# Patient Record
Sex: MALE | Race: OTHER | NOT HISPANIC OR LATINO | Employment: PART TIME | URBAN - METROPOLITAN AREA
[De-identification: names, ages, dates, MRNs, and addresses within clinical notes are randomized per-mention and may not be internally consistent; named-entity substitution may affect disease eponyms.]

---

## 2017-04-12 ENCOUNTER — HOSPITAL ENCOUNTER (OUTPATIENT)
Dept: RADIOLOGY | Facility: HOSPITAL | Age: 32
Discharge: HOME | End: 2017-04-12
Attending: INTERNAL MEDICINE
Payer: MEDICAID

## 2017-04-12 DIAGNOSIS — R05.9 COUGH: ICD-10-CM

## 2017-04-12 PROCEDURE — 71020 XR CHEST PA AND LATERAL: CPT | Mod: TC

## 2017-04-12 PROCEDURE — 71020 XR CHEST PA AND LATERAL: CPT | Mod: 26,,, | Performed by: RADIOLOGY

## 2018-06-20 ENCOUNTER — HOSPITAL ENCOUNTER (OUTPATIENT)
Dept: RADIOLOGY | Facility: HOSPITAL | Age: 33
Discharge: HOME | End: 2018-06-20
Payer: MEDICAID

## 2018-06-20 DIAGNOSIS — M54.50 LOWER BACK PAIN: ICD-10-CM

## 2018-06-20 DIAGNOSIS — M54.2 NECK PAIN: ICD-10-CM

## 2018-06-20 DIAGNOSIS — M54.2 NECK PAIN: Primary | ICD-10-CM

## 2018-06-20 PROCEDURE — 72120 X-RAY BEND ONLY L-S SPINE: CPT | Mod: TC,FY

## 2018-06-20 PROCEDURE — 72100 X-RAY EXAM L-S SPINE 2/3 VWS: CPT | Mod: 26,,, | Performed by: RADIOLOGY

## 2018-06-20 PROCEDURE — 72120 X-RAY BEND ONLY L-S SPINE: CPT | Mod: 26,,, | Performed by: RADIOLOGY

## 2018-06-20 PROCEDURE — 72050 X-RAY EXAM NECK SPINE 4/5VWS: CPT | Mod: 26,,, | Performed by: RADIOLOGY

## 2018-06-20 PROCEDURE — 72050 X-RAY EXAM NECK SPINE 4/5VWS: CPT | Mod: TC,FY

## 2019-05-17 ENCOUNTER — OCCUPATIONAL HEALTH (OUTPATIENT)
Dept: URGENT CARE | Facility: CLINIC | Age: 34
End: 2019-05-17

## 2019-05-17 DIAGNOSIS — Z02.1 PRE-EMPLOYMENT EXAMINATION: Primary | ICD-10-CM

## 2019-05-17 LAB
CTP QC/QA: YES
FECAL OCCULT BLOOD, POC: NEGATIVE

## 2019-05-17 PROCEDURE — 89240 OOH PANEL 3 (CMP, CBCW/DIFF, MUA, LIPID): ICD-10-PCS | Mod: S$GLB,,, | Performed by: NURSE PRACTITIONER

## 2019-05-17 PROCEDURE — 86703 HIV-1/HIV-2 1 RESULT ANTBDY: CPT | Mod: S$GLB,,, | Performed by: NURSE PRACTITIONER

## 2019-05-17 PROCEDURE — 86703 CHG HIV-1/HIV-2, SINGLE ASSAY: ICD-10-PCS | Mod: S$GLB,,, | Performed by: NURSE PRACTITIONER

## 2019-05-17 PROCEDURE — 86592 PR  SYPHILIS TEST NON TREPONEMAL ANTIBODY QUAL: ICD-10-PCS | Mod: S$GLB,,, | Performed by: NURSE PRACTITIONER

## 2019-05-17 PROCEDURE — 86580 POCT TB SKIN TEST: ICD-10-PCS | Mod: S$GLB,,, | Performed by: NURSE PRACTITIONER

## 2019-05-17 PROCEDURE — 89240 UNLISTED MISC PATH TEST: CPT | Mod: S$GLB,,, | Performed by: NURSE PRACTITIONER

## 2019-05-17 PROCEDURE — 82270 POCT OCCULT BLOOD STOOL: ICD-10-PCS | Mod: S$GLB,,, | Performed by: NURSE PRACTITIONER

## 2019-05-17 PROCEDURE — 86580 TB INTRADERMAL TEST: CPT | Mod: S$GLB,,, | Performed by: NURSE PRACTITIONER

## 2019-05-17 PROCEDURE — 82270 OCCULT BLOOD FECES: CPT | Mod: S$GLB,,, | Performed by: NURSE PRACTITIONER

## 2019-05-17 PROCEDURE — 99499 UNLISTED E&M SERVICE: CPT | Mod: S$GLB,,, | Performed by: NURSE PRACTITIONER

## 2019-05-17 PROCEDURE — 86592 SYPHILIS TEST NON-TREP QUAL: CPT | Mod: S$GLB,,, | Performed by: NURSE PRACTITIONER

## 2019-05-17 PROCEDURE — 92552 PURE TONE AUDIOMETRY AIR: CPT | Mod: S$GLB,,, | Performed by: NURSE PRACTITIONER

## 2019-05-17 PROCEDURE — 80305 OOH NON-DOT DRUG SCREEN: ICD-10-PCS | Mod: S$GLB,,, | Performed by: NURSE PRACTITIONER

## 2019-05-17 PROCEDURE — 99499 PHYSICAL, BASIC COMPLEXITY: ICD-10-PCS | Mod: S$GLB,,, | Performed by: NURSE PRACTITIONER

## 2019-05-17 PROCEDURE — 92552 AUDIOGRAM OCC MED: ICD-10-PCS | Mod: S$GLB,,, | Performed by: NURSE PRACTITIONER

## 2019-05-17 PROCEDURE — 80305 DRUG TEST PRSMV DIR OPT OBS: CPT | Mod: S$GLB,,, | Performed by: NURSE PRACTITIONER

## 2019-05-29 LAB
TB INDURATION - 48 HR READ: ABNORMAL MM
TB INDURATION - 72 HR READ: 10 MM
TB SKIN TEST - 48 HR READ: ABNORMAL
TB SKIN TEST - 72 HR READ: ABNORMAL

## 2019-05-31 ENCOUNTER — OCCUPATIONAL HEALTH (OUTPATIENT)
Dept: URGENT CARE | Facility: CLINIC | Age: 34
End: 2019-05-31

## 2019-05-31 DIAGNOSIS — Z02.1 PRE-EMPLOYMENT EXAMINATION: Primary | ICD-10-CM

## 2019-05-31 PROCEDURE — 71045 X-RAY EXAM CHEST 1 VIEW: CPT | Mod: FY,S$GLB,, | Performed by: RADIOLOGY

## 2019-05-31 PROCEDURE — 71045 XR CHEST 1 VIEW: ICD-10-PCS | Mod: FY,S$GLB,, | Performed by: RADIOLOGY

## 2020-11-25 ENCOUNTER — IMMUNIZATION (OUTPATIENT)
Dept: PHARMACY | Facility: CLINIC | Age: 35
End: 2020-11-25

## 2020-11-25 ENCOUNTER — IMMUNIZATION (OUTPATIENT)
Dept: PHARMACY | Facility: CLINIC | Age: 35
End: 2020-11-25
Payer: COMMERCIAL

## 2020-12-02 ENCOUNTER — CLINICAL SUPPORT (OUTPATIENT)
Dept: URGENT CARE | Facility: CLINIC | Age: 35
End: 2020-12-02
Payer: COMMERCIAL

## 2020-12-02 DIAGNOSIS — U07.1 COVID-19 VIRUS DETECTED: ICD-10-CM

## 2020-12-02 DIAGNOSIS — Z11.9 ENCOUNTER FOR SCREENING EXAMINATION FOR INFECTIOUS DISEASE: Primary | ICD-10-CM

## 2020-12-02 LAB
CTP QC/QA: YES
SARS-COV-2 RDRP RESP QL NAA+PROBE: POSITIVE

## 2020-12-02 PROCEDURE — U0002: ICD-10-PCS | Mod: QW,S$GLB,, | Performed by: INTERNAL MEDICINE

## 2020-12-02 PROCEDURE — U0002 COVID-19 LAB TEST NON-CDC: HCPCS | Mod: QW,S$GLB,, | Performed by: INTERNAL MEDICINE

## 2020-12-02 NOTE — LETTER
[address] [facility], [address] [place] [address]  Phone: [phone]  Fax: [phone]          Return to Work/School    Patient: Javier Gama  YOB: 1985   Date: 12/02/2020     To Whom It May Concern:     Javier Gama was in contact with/seen in my office on 12/02/2020. COVID-19 is present in our communities across the state. There is limited testing for COVID at this time, so not all patients can be tested. In this situation, your employee meets the following criteria:     Javier Gama has met the criteria for COVID-19 testing and has a POSITIVE result. He can return to work once they are asymptomatic for 24 hours without the use of fever reducing medications AND at least ten days from the start of symptoms (or from the first positive result if they have no symptoms).      If you have any questions or concerns, or if I can be of further assistance, please do not hesitate to contact me.     Sincerely,    NURSE URGENT CARE, [facility] [place]

## 2020-12-02 NOTE — PATIENT INSTRUCTIONS
Your test was POSITIVE for COVID-19 (coronavirus).       Please isolate yourself at home.  You may leave home and/or return to work once the following conditions are met:    If you were not hospitalized and are not severely immunocompromised*:   More than 10 days since symptoms first appeared AND   More than 24 hours fever free without medications AND   Symptoms have improved     If you were hospitalized OR are severely immunocompromised*:   More than 20 days since symptoms first appeared   More than 24 hours fever free without medications   Symptoms have improved    If you had no symptoms but tested positive:   More than 10 days since the date of the first positive test (20 days if severely immunocompromised).   If you develop symptoms, then use the guidelines above.     *Definition of severely immunocompromised:  - Current chemotherapy for cancer  - Untreated HIV with CD4 count less than 200  - Combined primary immunodeficiency disorder  - Prednisone more than 20 mg per day for more than 14 days  - Post-transplant patients    Additional instructions:   Separate yourself from other people and animals in your home.   Call ahead before visiting your doctor.   Wear a facemask when around others.   Cover your coughs and sneezes.   Wash your hands often with soap and water; hand  can be used, too.   Avoid sharing personal household items.   Wipe down surfaces used daily.   Monitor your symptoms. Seek prompt medical attention if your illness is worsening (e.g., difficulty breathing).    Before seeking care, call your healthcare provider.   If you have a medical emergency and need to call 911, notify the dispatch personnel that you have, or are being evaluated for COVID-19. If possible, put on a facemask before emergency medical services arrive.        Contact Tracing    As one of the next steps, you will receive a call or text from the [place] Department of [facility] ([facility]) COVID-19 Tracing Team.  See the contact information below so you know not to ignore the health departments call. It is important that you contact them back immediately so they can help.      Contact Tracer Number:  [phone]  Caller ID for most carriers: [place] Dept Health     What is contact tracing?  · Contact tracing is a process that helps identify everyone who has been in close contact with an infected person. Contact tracers let those people know they may have been exposed and guide them on next steps. Confidentiality is important for everyone; no one will be told who may have exposed them to the virus.  · Your involvement is important. The more we know about where and how this virus is spreading, the better chance we have at stopping it from spreading further.  What does exposure mean?  · Exposure means you have been within 6 feet for more than 15 minutes with a person who has or had COVID-19.  What kind of questions do the contact tracers ask?  · A contact tracer will confirm your basic contact information including name, address, phone number, and next of kin, as well as asking about any symptoms you may have had. Theyll also ask you how you think you may have gotten sick, such as places where you may have been exposed to the virus, and people you were with. Those names will never be shared with anyone outside of that call, and will only be used to help trace and stop the spread of the virus.   I have privacy concerns. How will the state use my information?  · Your privacy about your health is important. All calls are completed using call centers that use the appropriate health privacy protection measures (HIPAA compliance), meaning that your patient information is safe. No one will ever ask you any questions related to immigration status. Your health comes first.   Do I have to participate?  · You do not have to participate, but we strongly encourage you to. Contact tracing can help us catch and control new outbreaks as  theyre developing to keep your friends and family safe.   What if I dont hear from anyone?  · If you dont receive a call within 24 hours, you can call the number above right away to inquire about your status. That line is open from 8:00 am - 8:00 p.m., 7 days a week.  Contact tracing saves lives! Together, we have the power to beat this virus and keep our loved ones and neighbors safe.    For more information see CDC link below.      https://www.cdc.gov/coronavirus/2019-ncov/hcp/guidance-prevent-spread.html#precautions        Sources:  [facility], [place] Department [facility] and hospitals           Sincerely,     Simon Cisneros RT

## 2020-12-03 ENCOUNTER — NURSE TRIAGE (OUTPATIENT)
Dept: ADMINISTRATIVE | Facility: CLINIC | Age: 35
End: 2020-12-03

## 2020-12-03 NOTE — TELEPHONE ENCOUNTER
No answer, no vm to leave message. 2 attempts.    Reason for Disposition   Unable to complete triage due to phone connection issues    Protocols used: NO CONTACT OR DUPLICATE CONTACT CALL-A-AH

## 2022-06-30 ENCOUNTER — OFFICE VISIT (OUTPATIENT)
Dept: INTERNAL MEDICINE | Facility: CLINIC | Age: 37
End: 2022-06-30
Payer: COMMERCIAL

## 2022-06-30 VITALS
SYSTOLIC BLOOD PRESSURE: 124 MMHG | HEART RATE: 64 BPM | HEIGHT: 68 IN | DIASTOLIC BLOOD PRESSURE: 82 MMHG | WEIGHT: 160.5 LBS | BODY MASS INDEX: 24.32 KG/M2 | TEMPERATURE: 98 F

## 2022-06-30 DIAGNOSIS — Z13.220 LIPID SCREENING: ICD-10-CM

## 2022-06-30 DIAGNOSIS — Z11.59 ENCOUNTER FOR HEPATITIS C SCREENING TEST FOR LOW RISK PATIENT: ICD-10-CM

## 2022-06-30 DIAGNOSIS — Z11.4 ENCOUNTER FOR SCREENING FOR HIV: ICD-10-CM

## 2022-06-30 DIAGNOSIS — Z00.00 ROUTINE GENERAL MEDICAL EXAMINATION AT A HEALTH CARE FACILITY: Primary | ICD-10-CM

## 2022-06-30 DIAGNOSIS — Z83.3 FAMILY HISTORY OF DIABETES MELLITUS IN FATHER: ICD-10-CM

## 2022-06-30 DIAGNOSIS — Z13.21 ENCOUNTER FOR VITAMIN DEFICIENCY SCREENING: ICD-10-CM

## 2022-06-30 PROBLEM — Z86.16 HISTORY OF COVID-19: Status: ACTIVE | Noted: 2022-06-30

## 2022-06-30 PROCEDURE — 1159F PR MEDICATION LIST DOCUMENTED IN MEDICAL RECORD: ICD-10-PCS | Mod: CPTII,S$GLB,, | Performed by: INTERNAL MEDICINE

## 2022-06-30 PROCEDURE — 99385 PREV VISIT NEW AGE 18-39: CPT | Mod: S$GLB,,, | Performed by: INTERNAL MEDICINE

## 2022-06-30 PROCEDURE — 3079F DIAST BP 80-89 MM HG: CPT | Mod: CPTII,S$GLB,, | Performed by: INTERNAL MEDICINE

## 2022-06-30 PROCEDURE — 1160F PR REVIEW ALL MEDS BY PRESCRIBER/CLIN PHARMACIST DOCUMENTED: ICD-10-PCS | Mod: CPTII,S$GLB,, | Performed by: INTERNAL MEDICINE

## 2022-06-30 PROCEDURE — 1159F MED LIST DOCD IN RCRD: CPT | Mod: CPTII,S$GLB,, | Performed by: INTERNAL MEDICINE

## 2022-06-30 PROCEDURE — 3074F SYST BP LT 130 MM HG: CPT | Mod: CPTII,S$GLB,, | Performed by: INTERNAL MEDICINE

## 2022-06-30 PROCEDURE — 1160F RVW MEDS BY RX/DR IN RCRD: CPT | Mod: CPTII,S$GLB,, | Performed by: INTERNAL MEDICINE

## 2022-06-30 PROCEDURE — 3074F PR MOST RECENT SYSTOLIC BLOOD PRESSURE < 130 MM HG: ICD-10-PCS | Mod: CPTII,S$GLB,, | Performed by: INTERNAL MEDICINE

## 2022-06-30 PROCEDURE — 3008F BODY MASS INDEX DOCD: CPT | Mod: CPTII,S$GLB,, | Performed by: INTERNAL MEDICINE

## 2022-06-30 PROCEDURE — 99385 PR PREVENTIVE VISIT,NEW,18-39: ICD-10-PCS | Mod: S$GLB,,, | Performed by: INTERNAL MEDICINE

## 2022-06-30 PROCEDURE — 3008F PR BODY MASS INDEX (BMI) DOCUMENTED: ICD-10-PCS | Mod: CPTII,S$GLB,, | Performed by: INTERNAL MEDICINE

## 2022-06-30 PROCEDURE — 3079F PR MOST RECENT DIASTOLIC BLOOD PRESSURE 80-89 MM HG: ICD-10-PCS | Mod: CPTII,S$GLB,, | Performed by: INTERNAL MEDICINE

## 2022-06-30 RX ORDER — ASCORBIC ACID 500 MG
500 TABLET ORAL DAILY
COMMUNITY

## 2022-06-30 RX ORDER — MULTIVIT WITH MINERALS/HERBS
1 TABLET ORAL DAILY
COMMUNITY
End: 2023-02-07

## 2022-06-30 RX ORDER — CHOLECALCIFEROL (VITAMIN D3) 125 MCG
5000 CAPSULE ORAL 3 TIMES DAILY
COMMUNITY
End: 2022-08-01

## 2022-06-30 NOTE — PROGRESS NOTES
Chief C/o:    Establish Care and Foot Pain (Pt. c/o pain in (B) feet off and on for more than 1 yr. Pt.states his feet feel better with elevation.)        Health Care Maintenance    Health Maintenance       Date Due Completion Date    Hepatitis C Screening Never done ---    Lipid Panel Never done ---    COVID-19 Vaccine (1) Never done ---    HIV Screening Never done ---    Influenza Vaccine (Season Ended) 09/01/2022 11/24/2020    TETANUS VACCINE 11/24/2030 11/24/2020                 HISTORY OF PRESENT ILLNESS:    STEFAN Gama is a 37 y.o. male who presents to the clinic today for Establish Care and Foot Pain (Pt. c/o pain in (B) feet off and on for more than 1 yr. Pt.states his feet feel better with elevation.)  . Patient is having no chest pain, no shortness of breath, no fever no chills.  Patient is having no side effects related to current medications.                  ALLERGIES AND MEDICATIONS: updated and reviewed.  Review of patient's allergies indicates:  No Known Allergies      Problem List:  Patient Active Problem List   Diagnosis    Varicose vein of leg    BMI 24.0-24.9, adult    History of COVID-19    Family history of diabetes mellitus in father         CARE TEAM:    Patient Care Team:  Judi Pearl MD as PCP - General (Internal Medicine)  Juany Quintanilla MD as Consulting Physician (Internal Medicine)         REVIEW OF SYSTEMS:    Review of Systems   Constitutional: Negative for appetite change, chills, diaphoresis, fatigue, fever and unexpected weight change.   HENT: Negative for congestion, drooling, ear discharge, ear pain, facial swelling, hearing loss, nosebleeds, rhinorrhea, sinus pain, sneezing, sore throat, tinnitus, trouble swallowing and voice change.    Eyes: Negative for pain, discharge, redness, itching and visual disturbance.   Respiratory: Negative for cough, choking, chest tightness, shortness of breath, wheezing and stridor.    Cardiovascular: Negative for chest  "pain, palpitations and leg swelling.   Gastrointestinal: Negative for abdominal distention, abdominal pain, blood in stool, constipation, diarrhea, nausea and vomiting.   Endocrine: Negative for cold intolerance, heat intolerance, polydipsia, polyphagia and polyuria.   Genitourinary: Negative for difficulty urinating, dysuria, flank pain, frequency, hematuria and urgency.   Musculoskeletal: Negative for arthralgias, back pain, gait problem, joint swelling, myalgias, neck pain and neck stiffness.   Skin: Negative for color change, pallor, rash and wound.   Allergic/Immunologic: Negative for environmental allergies, food allergies and immunocompromised state.   Neurological: Negative for dizziness, tremors, seizures, syncope, speech difficulty, weakness, light-headedness, numbness and headaches.   Hematological: Negative for adenopathy. Does not bruise/bleed easily.   Psychiatric/Behavioral: Negative for agitation, behavioral problems, confusion, decreased concentration, dysphoric mood, hallucinations, sleep disturbance and suicidal ideas. The patient is not nervous/anxious.          PHYSICAL EXAM:    Vitals:    06/30/22 0844   BP: 124/82   Pulse: 64   Temp: 97.5 °F (36.4 °C)     Weight: 72.8 kg (160 lb 7.9 oz)   Height: 5' 7.72" (172 cm)   Body mass index is 24.61 kg/m².  Vitals:    06/30/22 0844   BP: 124/82   Pulse: 64   Temp: 97.5 °F (36.4 °C)   TempSrc: Temporal   Weight: 72.8 kg (160 lb 7.9 oz)   Height: 5' 7.72" (1.72 m)   PainSc: 0-No pain          Physical Exam  Vitals and nursing note reviewed.   Constitutional:       General: He is not in acute distress.     Appearance: He is not ill-appearing, toxic-appearing or diaphoretic.   HENT:      Head: Normocephalic and atraumatic.      Right Ear: Tympanic membrane, ear canal and external ear normal. There is no impacted cerumen.      Left Ear: Tympanic membrane, ear canal and external ear normal. There is no impacted cerumen.      Nose: Nose normal. No congestion " or rhinorrhea.      Mouth/Throat:      Mouth: Mucous membranes are moist.      Pharynx: Oropharynx is clear. No oropharyngeal exudate or posterior oropharyngeal erythema.   Eyes:      General: No scleral icterus.        Right eye: No discharge.         Left eye: No discharge.      Extraocular Movements: Extraocular movements intact.      Conjunctiva/sclera: Conjunctivae normal.      Pupils: Pupils are equal, round, and reactive to light.   Cardiovascular:      Rate and Rhythm: Normal rate and regular rhythm.      Pulses: Normal pulses.      Heart sounds: Normal heart sounds. No murmur heard.    No friction rub. No gallop.   Pulmonary:      Effort: Pulmonary effort is normal. No respiratory distress.      Breath sounds: Normal breath sounds. No stridor. No wheezing, rhonchi or rales.   Chest:      Chest wall: No tenderness.   Abdominal:      General: Bowel sounds are normal. There is no distension.      Palpations: Abdomen is soft. There is no mass.      Tenderness: There is no abdominal tenderness. There is no guarding or rebound.      Hernia: No hernia is present.   Musculoskeletal:         General: No swelling, tenderness, deformity or signs of injury. Normal range of motion.      Cervical back: Normal range of motion and neck supple. No rigidity.      Right lower leg: No edema.      Left lower leg: No edema.   Lymphadenopathy:      Cervical: No cervical adenopathy.   Skin:     General: Skin is warm and dry.      Capillary Refill: Capillary refill takes less than 2 seconds.      Coloration: Skin is not jaundiced or pale.      Findings: No bruising, erythema, lesion or rash.   Neurological:      General: No focal deficit present.      Mental Status: He is alert and oriented to person, place, and time.      Cranial Nerves: No cranial nerve deficit.      Sensory: No sensory deficit.      Motor: No weakness.      Coordination: Coordination normal.      Deep Tendon Reflexes: Reflexes normal.   Psychiatric:         Mood  and Affect: Mood normal.         Behavior: Behavior normal.         Thought Content: Thought content normal.         Judgment: Judgment normal.            Labs:    No results found for: GLU, NA, K, CL, CO2, BUN, CREATININE, CALCIUM, PROT, ALBUMIN, BILITOT, ALKPHOS, AST, ALT, ANIONGAP, ESTGFRAFRICA, EGFRNONAA  No results found for: WBC, RBC, HGB, HCT, MCV, RDW, PLT   No results found for: CHOL, TRIG, HDL, LDLCALC, TOTALCHOLEST  No results found for: TSH  No results found for: HGBA1C, ESTIMATEDAVG   No components found for: MICROALBUMIN/CREATININE    ASSESSMENT & PLAN:    1. Routine general medical examination at a health care facility  - Comprehensive Metabolic Panel; Future  - Lipid Panel; Future  - TSH; Future  - CBC Auto Differential; Future  - Hepatitis C Antibody; Future  - HIV 1/2 Ag/Ab (4th Gen); Future  - Comprehensive Metabolic Panel  - Lipid Panel  - TSH  - CBC Auto Differential  - Hepatitis C Antibody  - HIV 1/2 Ag/Ab (4th Gen)    2. BMI 24.0-24.9, adult    3. Lipid screening  - Lipid Panel; Future  - Lipid Panel    4. Encounter for screening for HIV  - HIV 1/2 Ag/Ab (4th Gen); Future  - HIV 1/2 Ag/Ab (4th Gen)    5. Encounter for hepatitis C screening test for low risk patient  - Hepatitis C Antibody; Future  - Hepatitis C Antibody    6. Encounter for vitamin deficiency screening    7. Family history of diabetes mellitus in father       Having to be established with no significant new complains, his weight is within normal limits and advised to keep BMI below 25 with healthy diet and exercise, will start with a compressive lab test and follow-up after that..    Orders Placed This Encounter   Procedures    Comprehensive Metabolic Panel    Lipid Panel    TSH    CBC Auto Differential    Hepatitis C Antibody    HIV 1/2 Ag/Ab (4th Gen)      No follow-ups on file. or sooner as needed.    Patient was counseled and questions and concerns were addressed.    Please note:  Parts of this report were done using  a dictation software, voice to text, and sometimes the text contains some uncorrected words or sentences that are missed during revision.

## 2022-07-26 LAB
25(OH)D3+25(OH)D2 SERPL-MCNC: 131.6 NG/ML (ref 30–100)
ALBUMIN SERPL-MCNC: 4.8 G/DL (ref 4–5)
ALBUMIN/GLOB SERPL: 2.4 {RATIO} (ref 1.2–2.2)
ALP SERPL-CCNC: 62 IU/L (ref 44–121)
ALT SERPL-CCNC: 26 IU/L (ref 0–44)
AST SERPL-CCNC: 23 IU/L (ref 0–40)
BASOPHILS # BLD AUTO: 0 X10E3/UL (ref 0–0.2)
BASOPHILS NFR BLD AUTO: 1 %
BILIRUB SERPL-MCNC: 0.2 MG/DL (ref 0–1.2)
BUN SERPL-MCNC: 14 MG/DL (ref 6–20)
BUN/CREAT SERPL: 16 (ref 9–20)
CALCIUM SERPL-MCNC: 9.6 MG/DL (ref 8.7–10.2)
CHLORIDE SERPL-SCNC: 101 MMOL/L (ref 96–106)
CHOLEST SERPL-MCNC: 188 MG/DL (ref 100–199)
CO2 SERPL-SCNC: 26 MMOL/L (ref 20–29)
CREAT SERPL-MCNC: 0.86 MG/DL (ref 0.76–1.27)
EOSINOPHIL # BLD AUTO: 0.4 X10E3/UL (ref 0–0.4)
EOSINOPHIL NFR BLD AUTO: 6 %
ERYTHROCYTE [DISTWIDTH] IN BLOOD BY AUTOMATED COUNT: 14 % (ref 11.6–15.4)
EST. GFR  (NO RACE VARIABLE): 113 ML/MIN/1.73
GLOBULIN SER CALC-MCNC: 2 G/DL (ref 1.5–4.5)
GLUCOSE SERPL-MCNC: 85 MG/DL (ref 65–99)
HCT VFR BLD AUTO: 48.4 % (ref 37.5–51)
HCV AB S/CO SERPL IA: <0.1 S/CO RATIO (ref 0–0.9)
HDLC SERPL-MCNC: 48 MG/DL
HGB BLD-MCNC: 14.5 G/DL (ref 13–17.7)
HIV 1+2 AB+HIV1 P24 AG SERPL QL IA: NON REACTIVE
IMM GRANULOCYTES # BLD AUTO: 0 X10E3/UL (ref 0–0.1)
IMM GRANULOCYTES NFR BLD AUTO: 1 %
IMP & REVIEW OF LAB RESULTS: NORMAL
LDLC SERPL CALC-MCNC: 115 MG/DL (ref 0–99)
LYMPHOCYTES # BLD AUTO: 2.7 X10E3/UL (ref 0.7–3.1)
LYMPHOCYTES NFR BLD AUTO: 41 %
MCH RBC QN AUTO: 25 PG (ref 26.6–33)
MCHC RBC AUTO-ENTMCNC: 30 G/DL (ref 31.5–35.7)
MCV RBC AUTO: 84 FL (ref 79–97)
MONOCYTES # BLD AUTO: 0.5 X10E3/UL (ref 0.1–0.9)
MONOCYTES NFR BLD AUTO: 7 %
NEUTROPHILS # BLD AUTO: 2.9 X10E3/UL (ref 1.4–7)
NEUTROPHILS NFR BLD AUTO: 44 %
PLATELET # BLD AUTO: 263 X10E3/UL (ref 150–450)
POTASSIUM SERPL-SCNC: 4.5 MMOL/L (ref 3.5–5.2)
PROT SERPL-MCNC: 6.8 G/DL (ref 6–8.5)
RBC # BLD AUTO: 5.79 X10E6/UL (ref 4.14–5.8)
SODIUM SERPL-SCNC: 138 MMOL/L (ref 134–144)
TRIGL SERPL-MCNC: 141 MG/DL (ref 0–149)
TSH SERPL DL<=0.005 MIU/L-ACNC: 1.38 UIU/ML (ref 0.45–4.5)
VLDLC SERPL CALC-MCNC: 25 MG/DL (ref 5–40)
WBC # BLD AUTO: 6.6 X10E3/UL (ref 3.4–10.8)

## 2022-08-01 ENCOUNTER — OFFICE VISIT (OUTPATIENT)
Dept: INTERNAL MEDICINE | Facility: CLINIC | Age: 37
End: 2022-08-01
Payer: COMMERCIAL

## 2022-08-01 VITALS
DIASTOLIC BLOOD PRESSURE: 82 MMHG | BODY MASS INDEX: 24.94 KG/M2 | HEART RATE: 69 BPM | TEMPERATURE: 98 F | SYSTOLIC BLOOD PRESSURE: 130 MMHG | WEIGHT: 164.56 LBS | HEIGHT: 68 IN

## 2022-08-01 DIAGNOSIS — M79.672 CHRONIC PAIN OF BOTH FEET: ICD-10-CM

## 2022-08-01 DIAGNOSIS — E78.00 PURE HYPERCHOLESTEROLEMIA: ICD-10-CM

## 2022-08-01 DIAGNOSIS — Z00.00 ROUTINE GENERAL MEDICAL EXAMINATION AT A HEALTH CARE FACILITY: Primary | ICD-10-CM

## 2022-08-01 DIAGNOSIS — M79.671 CHRONIC PAIN OF BOTH FEET: ICD-10-CM

## 2022-08-01 DIAGNOSIS — E67.3 HYPERVITAMINOSIS D: ICD-10-CM

## 2022-08-01 DIAGNOSIS — G89.29 CHRONIC PAIN OF BOTH FEET: ICD-10-CM

## 2022-08-01 PROCEDURE — 99395 PREV VISIT EST AGE 18-39: CPT | Mod: S$GLB,,, | Performed by: INTERNAL MEDICINE

## 2022-08-01 PROCEDURE — 1160F RVW MEDS BY RX/DR IN RCRD: CPT | Mod: CPTII,S$GLB,, | Performed by: INTERNAL MEDICINE

## 2022-08-01 PROCEDURE — 99395 PR PREVENTIVE VISIT,EST,18-39: ICD-10-PCS | Mod: S$GLB,,, | Performed by: INTERNAL MEDICINE

## 2022-08-01 PROCEDURE — 1159F MED LIST DOCD IN RCRD: CPT | Mod: CPTII,S$GLB,, | Performed by: INTERNAL MEDICINE

## 2022-08-01 PROCEDURE — 3008F BODY MASS INDEX DOCD: CPT | Mod: CPTII,S$GLB,, | Performed by: INTERNAL MEDICINE

## 2022-08-01 PROCEDURE — 3075F PR MOST RECENT SYSTOLIC BLOOD PRESS GE 130-139MM HG: ICD-10-PCS | Mod: CPTII,S$GLB,, | Performed by: INTERNAL MEDICINE

## 2022-08-01 PROCEDURE — 3008F PR BODY MASS INDEX (BMI) DOCUMENTED: ICD-10-PCS | Mod: CPTII,S$GLB,, | Performed by: INTERNAL MEDICINE

## 2022-08-01 PROCEDURE — 1160F PR REVIEW ALL MEDS BY PRESCRIBER/CLIN PHARMACIST DOCUMENTED: ICD-10-PCS | Mod: CPTII,S$GLB,, | Performed by: INTERNAL MEDICINE

## 2022-08-01 PROCEDURE — 3075F SYST BP GE 130 - 139MM HG: CPT | Mod: CPTII,S$GLB,, | Performed by: INTERNAL MEDICINE

## 2022-08-01 PROCEDURE — 1159F PR MEDICATION LIST DOCUMENTED IN MEDICAL RECORD: ICD-10-PCS | Mod: CPTII,S$GLB,, | Performed by: INTERNAL MEDICINE

## 2022-08-01 PROCEDURE — 3079F DIAST BP 80-89 MM HG: CPT | Mod: CPTII,S$GLB,, | Performed by: INTERNAL MEDICINE

## 2022-08-01 PROCEDURE — 3079F PR MOST RECENT DIASTOLIC BLOOD PRESSURE 80-89 MM HG: ICD-10-PCS | Mod: CPTII,S$GLB,, | Performed by: INTERNAL MEDICINE

## 2022-08-01 NOTE — PROGRESS NOTES
Chief C/o:    Follow-up (Lab results) and Leg Pain (Bilateral legs radiates down to both feet)        Health Care Maintenance    Health Maintenance       Date Due Completion Date    Influenza Vaccine (1) 09/01/2022 11/24/2020    Lipid Panel 07/25/2027 7/25/2022    TETANUS VACCINE 11/24/2030 11/24/2020                 HISTORY OF PRESENT ILLNESS:    STEFAN Gama is a 37 y.o. male who presents to the clinic today for Follow-up (Lab results) and Leg Pain (Bilateral legs radiates down to both feet)  .  Patient is having pain in his feet, both sides, for several years, the pain is on and off, it increased with standing and walking, improves with elevation of the legs, patient sleeps putting about 3 pillows under his feet.   Patient is having no chest pain, no shortness of breath, no fever no chills.  Patient is having no side effects related to current medications.                   ALLERGIES AND MEDICATIONS: updated and reviewed.  Review of patient's allergies indicates:  No Known Allergies  Medication List with Changes/Refills   Current Medications    ASCORBIC ACID, VITAMIN C, (VITAMIN C) 500 MG TABLET    Take 500 mg by mouth once daily.    B COMPLEX VITAMINS TABLET    Take 1 tablet by mouth once daily.   Discontinued Medications    CHOLECALCIFEROL, VITAMIN D3, 125 MCG (5,000 UNIT) CAPSULE    Take 5,000 Units by mouth 3 (three) times daily.       Problem List:  Patient Active Problem List   Diagnosis    Varicose vein of leg    History of COVID-19    Family history of diabetes mellitus in father    Pure hypercholesterolemia    Hypervitaminosis D    BMI 25.0-25.9,adult    Chronic pain of both feet         CARE TEAM:    Patient Care Team:  Judi Pearl MD as PCP - General (Internal Medicine)  Juany Quintanilla MD as Consulting Physician (Internal Medicine)         REVIEW OF SYSTEMS:    Review of Systems   Constitutional: Negative for appetite change, chills, diaphoresis, fatigue, fever and unexpected  "weight change.   HENT: Negative for congestion, drooling, ear discharge, ear pain, facial swelling, hearing loss, nosebleeds, rhinorrhea, sinus pain, sneezing, sore throat, tinnitus, trouble swallowing and voice change.    Eyes: Negative for pain, discharge, redness, itching and visual disturbance.   Respiratory: Negative for cough, choking, chest tightness, shortness of breath, wheezing and stridor.    Cardiovascular: Negative for chest pain, palpitations and leg swelling.   Gastrointestinal: Negative for abdominal distention, abdominal pain, blood in stool, constipation, diarrhea, nausea and vomiting.   Endocrine: Negative for cold intolerance, heat intolerance, polydipsia, polyphagia and polyuria.   Genitourinary: Negative for difficulty urinating, dysuria, flank pain, frequency, hematuria and urgency.   Musculoskeletal: Negative for arthralgias, back pain, gait problem, joint swelling, myalgias, neck pain and neck stiffness.        Bilateral feet pain   Skin: Negative for color change, pallor, rash and wound.   Allergic/Immunologic: Negative for environmental allergies, food allergies and immunocompromised state.   Neurological: Negative for dizziness, tremors, seizures, syncope, speech difficulty, weakness, light-headedness, numbness and headaches.   Hematological: Negative for adenopathy. Does not bruise/bleed easily.   Psychiatric/Behavioral: Negative for agitation, behavioral problems, confusion, decreased concentration, dysphoric mood, hallucinations, sleep disturbance and suicidal ideas. The patient is not nervous/anxious.          PHYSICAL EXAM:    Vitals:    08/01/22 1414   BP: 130/82   Pulse: 69   Temp: 97.9 °F (36.6 °C)     Weight: 74.6 kg (164 lb 9.2 oz)   Height: 5' 7.72" (172 cm)   Body mass index is 25.23 kg/m².  Vitals:    08/01/22 1414   BP: 130/82   Pulse: 69   Temp: 97.9 °F (36.6 °C)   TempSrc: Temporal   Weight: 74.6 kg (164 lb 9.2 oz)   Height: 5' 7.72" (1.72 m)   PainSc:   8    "       Physical Exam  Vitals and nursing note reviewed.   Constitutional:       General: He is not in acute distress.     Appearance: He is not ill-appearing, toxic-appearing or diaphoretic.   HENT:      Head: Normocephalic and atraumatic.      Right Ear: Tympanic membrane, ear canal and external ear normal. There is no impacted cerumen.      Left Ear: Tympanic membrane, ear canal and external ear normal. There is no impacted cerumen.      Nose: Nose normal. No congestion or rhinorrhea.      Mouth/Throat:      Mouth: Mucous membranes are moist.      Pharynx: Oropharynx is clear. No oropharyngeal exudate or posterior oropharyngeal erythema.   Eyes:      General: No scleral icterus.        Right eye: No discharge.         Left eye: No discharge.      Extraocular Movements: Extraocular movements intact.      Conjunctiva/sclera: Conjunctivae normal.      Pupils: Pupils are equal, round, and reactive to light.   Cardiovascular:      Rate and Rhythm: Normal rate and regular rhythm.      Pulses: Normal pulses.      Heart sounds: Normal heart sounds. No murmur heard.    No friction rub. No gallop.   Pulmonary:      Effort: Pulmonary effort is normal. No respiratory distress.      Breath sounds: Normal breath sounds. No stridor. No wheezing, rhonchi or rales.   Chest:      Chest wall: No tenderness.   Abdominal:      General: Bowel sounds are normal. There is no distension.      Palpations: Abdomen is soft. There is no mass.      Tenderness: There is no abdominal tenderness. There is no guarding or rebound.      Hernia: No hernia is present.   Musculoskeletal:         General: No swelling, tenderness, deformity or signs of injury. Normal range of motion.      Cervical back: Normal range of motion and neck supple. No rigidity.      Right lower leg: No edema.      Left lower leg: No edema.      Comments: Feet examination was unremarkable   Lymphadenopathy:      Cervical: No cervical adenopathy.   Skin:     General: Skin is warm  and dry.      Capillary Refill: Capillary refill takes less than 2 seconds.      Coloration: Skin is not jaundiced or pale.      Findings: No bruising, erythema, lesion or rash.   Neurological:      General: No focal deficit present.      Mental Status: He is alert and oriented to person, place, and time.      Cranial Nerves: No cranial nerve deficit.      Sensory: No sensory deficit.      Motor: No weakness.      Coordination: Coordination normal.      Deep Tendon Reflexes: Reflexes normal.   Psychiatric:         Mood and Affect: Mood normal.         Behavior: Behavior normal.         Thought Content: Thought content normal.         Judgment: Judgment normal.      __________________________________________________________  Questionnaires to be scanned to the media section of patient's EHR records:    PHQ-9.  DANNA-7.    Safety at home.  -------------------------------  Past medical history, Family history, Social history and Allergies were reviewed.      Labs:  Discussed with patient.    Lab Results   Component Value Date    GLU 85 07/25/2022     07/25/2022    K 4.5 07/25/2022     07/25/2022    CO2 26 07/25/2022    BUN 14 07/25/2022    CREATININE 0.86 07/25/2022    CALCIUM 9.6 07/25/2022    ALBUMIN 4.8 07/25/2022    BILITOT 0.2 07/25/2022    AST 23 07/25/2022    ALT 26 07/25/2022     Lab Results   Component Value Date    WBC 6.6 07/25/2022    RBC 5.79 07/25/2022    HGB 14.5 07/25/2022    HCT 48.4 07/25/2022    MCV 84 07/25/2022    RDW 14.0 07/25/2022     07/25/2022      Lab Results   Component Value Date    CHOL 188 07/25/2022    TRIG 141 07/25/2022    HDL 48 07/25/2022    LDLCALC 115 (H) 07/25/2022     Lab Results   Component Value Date    TSH 1.380 07/25/2022     No results found for: HGBA1C, ESTIMATEDAVG   No components found for: MICROALBUMIN/CREATININE    ASSESSMENT & PLAN:    1. Routine general medical examination at a health care facility    2. Pure hypercholesterolemia  Mild, diet and  exercise, re-check in 6 months.  3. Hypervitaminosis D  Patient was taking 37727 unites of Vit D3 a day for a long time, he stopped vitamin d a week ago after reviewing his blood test through My Chart.  Keep off vitamin D, and re-check in 1 month. He indicated that in the past he was Dx with Vit D def, that's why he was taking that high dose of Vitamin D.  May resume vitamin D3 at 2000 unites par day after the level is down.  4. Chronic pain of both feet  - Ambulatory referral/consult to Podiatry; Future    5. BMI 25.0-25.9,adult   Healthy diet, exercise, and weight monitoring are essential for weight management.    Weight loss was discussed.  Ultimate goal is BMI below 25.           Orders Placed This Encounter   Procedures    Ambulatory referral/consult to Podiatry      Follow up in about 6 months (around 2/1/2023), or if symptoms worsen or fail to improve. or sooner as needed.    Patient was counseled and questions and concerns were addressed.    Please note:  Parts of this report were done using a dictation software, voice to text, and sometimes the text contains some uncorrected words or sentences that are missed during revision.

## 2022-08-04 ENCOUNTER — OFFICE VISIT (OUTPATIENT)
Dept: PODIATRY | Facility: CLINIC | Age: 37
End: 2022-08-04
Payer: MEDICAID

## 2022-08-04 VITALS — HEIGHT: 68 IN | WEIGHT: 164.44 LBS | BODY MASS INDEX: 24.92 KG/M2

## 2022-08-04 DIAGNOSIS — M77.40 METATARSALGIA, UNSPECIFIED LATERALITY: ICD-10-CM

## 2022-08-04 DIAGNOSIS — M72.2 PLANTAR FASCIITIS: ICD-10-CM

## 2022-08-04 DIAGNOSIS — M79.672 CHRONIC PAIN OF BOTH FEET: Primary | ICD-10-CM

## 2022-08-04 DIAGNOSIS — G89.29 CHRONIC PAIN OF BOTH FEET: Primary | ICD-10-CM

## 2022-08-04 DIAGNOSIS — M79.671 CHRONIC PAIN OF BOTH FEET: Primary | ICD-10-CM

## 2022-08-04 PROCEDURE — 1159F PR MEDICATION LIST DOCUMENTED IN MEDICAL RECORD: ICD-10-PCS | Mod: CPTII,,, | Performed by: PODIATRIST

## 2022-08-04 PROCEDURE — 1159F MED LIST DOCD IN RCRD: CPT | Mod: CPTII,,, | Performed by: PODIATRIST

## 2022-08-04 PROCEDURE — 99203 OFFICE O/P NEW LOW 30 MIN: CPT | Mod: S$PBB,,, | Performed by: PODIATRIST

## 2022-08-04 PROCEDURE — 3008F BODY MASS INDEX DOCD: CPT | Mod: CPTII,,, | Performed by: PODIATRIST

## 2022-08-04 PROCEDURE — 99203 PR OFFICE/OUTPT VISIT, NEW, LEVL III, 30-44 MIN: ICD-10-PCS | Mod: S$PBB,,, | Performed by: PODIATRIST

## 2022-08-04 PROCEDURE — 99999 PR PBB SHADOW E&M-EST. PATIENT-LVL IV: CPT | Mod: PBBFAC,,, | Performed by: PODIATRIST

## 2022-08-04 PROCEDURE — 3008F PR BODY MASS INDEX (BMI) DOCUMENTED: ICD-10-PCS | Mod: CPTII,,, | Performed by: PODIATRIST

## 2022-08-04 PROCEDURE — 99999 PR PBB SHADOW E&M-EST. PATIENT-LVL IV: ICD-10-PCS | Mod: PBBFAC,,, | Performed by: PODIATRIST

## 2022-08-04 PROCEDURE — 99214 OFFICE O/P EST MOD 30 MIN: CPT | Mod: PBBFAC,PO | Performed by: PODIATRIST

## 2022-08-09 NOTE — PROGRESS NOTES
Subjective:      Patient ID: Javier Gama is a 37 y.o. male.    Chief Complaint: Foot Pain (B/l)    Javier is a 37 y.o. male who presents to the podiatry clinic  with complaint of  bilateral foot pain. Onset of the symptoms was several weeks ago. Precipitating event: none known. Current symptoms include: ability to bear weight, but with some pain. Aggravating factors: any weight bearing. Symptoms have progressed to a point and plateaued. Patient has had no prior foot problems. Evaluation to date: none. Treatment to date: none. Patients rates pain 4/10 on pain scale.        Review of Systems   Constitutional: Negative for chills.   Cardiovascular: Negative for chest pain and claudication.   Respiratory: Negative for cough.    Skin: Positive for color change, dry skin and nail changes.   Musculoskeletal: Positive for joint pain.   Gastrointestinal: Negative for nausea.   Neurological: Positive for paresthesias. Negative for numbness.   Psychiatric/Behavioral: The patient is not nervous/anxious.            Objective:      Physical Exam  Constitutional:       Appearance: He is well-developed.      Comments: Oriented to time, place, and person.   Cardiovascular:      Comments: DP and PT pulses are palpable bilaterally. 3 sec capillary refill time and toes and feet are warm to touch proximally .  There is  hair growth on the feet and toes b/l. There is no edema b/l. No spider veins or varicosities present b/l.     Musculoskeletal:      Comments: Equinus noted b/l ankles with < 10 deg DF noted. MMT 5/5 in DF/PF/Inv/Ev resistance with no reproduction of pain in any direction. Passive range of motion of ankle and pedal joints is painless b/l.  Pain on palpation plantar medial B/L heel , no pain with ROM or MMT or medial and lateral compression of heel, - tinel's sign    Moderate pain on palpation distal IM spaces 2-4 with pain radiating into adjacent toes     Feet:      Right foot:      Skin integrity: No callus or  dry skin.      Left foot:      Skin integrity: No callus or dry skin.   Lymphadenopathy:      Comments: Negative lymphadenopathy bilateral popliteal fossa and tarsal tunnel.   Skin:     Comments: No open lesions, lacerations or wounds noted.Interdigital spaces clean, dry and intact b/l. No erythema noted to b/l foot.  Nails normal color and trophic qualities.     Neurological:      Mental Status: He is alert.      Comments: Light touch, proprioception, and sharp/dull sensation are all intact bilaterally. Protective threshold with the Agawam-Wienstein monofilament is intact bilaterally.      Subjective paresthesias with no clearly identifiable source or trigger.      Psychiatric:         Behavior: Behavior is cooperative.               Assessment:       Encounter Diagnoses   Name Primary?    Chronic pain of both feet Yes    Plantar fasciitis     Metatarsalgia, unspecified laterality          Plan:       Javier was seen today for foot pain.    Diagnoses and all orders for this visit:    Chronic pain of both feet  -     Ambulatory referral/consult to Podiatry  -     Ambulatory referral/consult to Physical/Occupational Therapy; Future  -     X-Ray Foot Complete Bilateral; Future  -     Ambulatory referral/consult to Neurology; Future    Plantar fasciitis    Metatarsalgia, unspecified laterality      I counseled the patient on his conditions, their implications and medical management.    Discussed different treatment options for heel pain. including conservative and interventional.  I gave written and verbal instructions on heel cord stretching and this was demonstrated for the patient. Patient expressed understanding. Discussed wearing appropriate shoe gear and avoiding flats, slippers, sandals, barefoot, and sockfeet. Recommended arch supports. My recommendation for OTC supports is [employer], ASICS tennis shoes.       Patient instructed on adequate icing techniques. Patient should ice the affected area at  least once per day x 10 minutes for 10 days . I advised the  patient that extra icing would also be beneficial to ensure adequate anti inflammatory effect     Stretching handout dispensed to patient. Instructions on adequate stretching reviewed in clinic        B/L xray to assess underlying deformity and for underlying osseous pathology.     Referral placed to PT    Referral placed to neurology    RTC PRN

## 2022-08-12 ENCOUNTER — HOSPITAL ENCOUNTER (OUTPATIENT)
Dept: RADIOLOGY | Facility: HOSPITAL | Age: 37
Discharge: HOME | End: 2022-08-12
Attending: PODIATRIST
Payer: COMMERCIAL

## 2022-08-12 DIAGNOSIS — M79.671 CHRONIC PAIN OF BOTH FEET: ICD-10-CM

## 2022-08-12 DIAGNOSIS — G89.29 CHRONIC PAIN OF BOTH FEET: ICD-10-CM

## 2022-08-12 DIAGNOSIS — M79.672 CHRONIC PAIN OF BOTH FEET: ICD-10-CM

## 2022-08-12 PROCEDURE — 73630 X-RAY EXAM OF FOOT: CPT | Mod: TC,50,FY,PO

## 2022-08-12 PROCEDURE — 73630 XR FOOT COMPLETE 3 VIEW BILATERAL: ICD-10-PCS | Mod: 26,50,, | Performed by: RADIOLOGY

## 2022-08-12 PROCEDURE — 73630 X-RAY EXAM OF FOOT: CPT | Mod: 26,50,, | Performed by: RADIOLOGY

## 2022-08-22 ENCOUNTER — DOCUMENTATION ONLY (OUTPATIENT)
Dept: REHABILITATION | Facility: OTHER | Age: 37
End: 2022-08-22
Payer: MEDICAID

## 2022-08-22 ENCOUNTER — CLINICAL SUPPORT (OUTPATIENT)
Dept: REHABILITATION | Facility: OTHER | Age: 37
End: 2022-08-22
Attending: PODIATRIST
Payer: MEDICAID

## 2022-08-22 DIAGNOSIS — G89.29 CHRONIC PAIN OF BOTH FEET: ICD-10-CM

## 2022-08-22 DIAGNOSIS — M25.672 DECREASED RANGE OF MOTION OF BOTH ANKLES: ICD-10-CM

## 2022-08-22 DIAGNOSIS — M79.671 CHRONIC PAIN OF BOTH FEET: ICD-10-CM

## 2022-08-22 DIAGNOSIS — M79.672 CHRONIC PAIN OF BOTH FEET: ICD-10-CM

## 2022-08-22 DIAGNOSIS — M25.671 DECREASED RANGE OF MOTION OF BOTH ANKLES: ICD-10-CM

## 2022-08-22 PROCEDURE — 97161 PT EVAL LOW COMPLEX 20 MIN: CPT | Mod: PN

## 2022-08-22 PROCEDURE — 97110 THERAPEUTIC EXERCISES: CPT | Mod: PN

## 2022-08-22 NOTE — PATIENT INSTRUCTIONS
SEATED CALF STRETCH - GASTROC      While sitting, use a towel or other strap looped around your foot. Gently pull your ankle back until a stretch is felt along the back of your lower leg. Maintain your target knee straight the entire time.     Perform 3 reps, hold for 30 seconds, twice a day.    Copyright © 2022 [employer]      SEATED CALF STRETCH - SOLEUS          While sitting, use a towel or other strap looped around your foot. Gently pull your ankle back until a stretch is felt along the back of your lower leg.     Perform 3 reps, hold for 30 seconds, twice a day.    Your knee should be slightly bent the entire time.    Copyright © 2022 [employer]    STANDING HEEL RAISES - CALF RAISES - BILATERAL        While standing, raise up on your toes as you lift your heels off the ground.    Perform 3 sets of 12 reps.    Copyright © 2022 [employer]    HEEL RAISES - INTERNAL ROTATION        Stand with your toes turned inward (internally rotated hips). Next, raise up on your toes as you lift your heels off the ground. Lower slowly and repeat.    Perform 3 sets of 12 reps.    Copyright © 2022 [employer]    HEEL RAISES - EXTERNAL ROTATION        Stand with your toes turned outward (externally rotated hips). Next, raise up on your toes as you lift your heels off the ground. Lower slowly and repeat.    Perform 3 sets of 12 reps.    Copyright © 2022 [employer]    Bent Knee Heel Raise / Soleus Heel Raise        Stand near a support, feet hip - shoulder width apart.     Bend the knees as shown, then lift the heels off the ground as high as able. Hold briefly, return to start position, and repeat as directed.    Perform 3 sets of 12 reps.    Copyright © 2022 [employer]

## 2022-08-22 NOTE — PROGRESS NOTES
"[facility]   Physical Therapy Initial Evaluation     Date: 8/22/2022   Name: Javier Gama  Clinic Number: [id number]    Therapy Diagnosis: No diagnosis found.  Physician: Julianna Noyola DPM    Physician Orders: PT Eval and Treat   Dry Needling  Medical Diagnosis from Referral: M79.671,G89.29,M79.672 (ICD-10-CM) - Chronic pain of both feet  Evaluation Date: 8/22/2022  Authorization Period Expiration: 8/4/2023  Plan of Care Expiration: ***  Progress Note Due: 9/22/2022  Visit # / Visits authorized: 1/ 1   FOTO: 1/5    Precautions: Standard     Time In: ***  Time Out: ***  Total Appointment Time (timed & untimed codes): *** minutes      SUBJECTIVE     Date of onset: ***    History of current condition - Javier reports: ***    Falls: ***    Imaging, {Mri/ctscan/bone scan:15128}: ***    Prior Therapy: ***  Social History: *** {LIVES WITH:39494}  Occupation: ***  Prior Level of Function: ***  Current Level of Function: ***    Pain:  Current {0-10:20507::"0"}/10, worst {0-10:20507::"0"}/10, best {0-10:20507::"0"}/10   Location: {RIGHT LEFT BILATERAL:71619} {LOCATION ON BODY:19323} {Pain Loc:97611}  Description: {Pain Description:98859}  Aggravating Factors: {Causes; Pain:36468}  Easing Factors: {Pain (activities that relieve):47274}    Patients goals: ***     Medical History:   No past medical history on file.    Surgical History:   Javier Gama  has a past surgical history that includes Tonsillectomy.    Medications:   Javier has a current medication list which includes the following prescription(s): ascorbic acid (vitamin c) and b complex vitamins.    Allergies:   Review of patient's allergies indicates:  No Known Allergies       OBJECTIVE     Observation: ***    Posture: ***    Ankle AROM/PROM  Left  Right    Dorsiflexion:  -in half kneeling  Plantarflexion:  Inversion:  Eversion:    1st MTP flexion:  1st MTP extension:     Measure in degrees, *indicates pain with " movement        Lower Extremity Strength    MMT   Left  Right    Hip:  Flexion  Extension  Abduction  Adduction  External Rotation  Internal rotation    Knee:  Flexion  Extension    Ankle:  Dorsiflexion  Plantar flexion  Inversion  Eversion    Step down test: ***      Function:    - SLS R: ***  - SLS L: ***  - Squat: ***   - Sit <--> Stand:***   - Bed Mobility: ***    Joint Mobility: ***      Palpation: ***    Sensation: ***    Flexibility: ***   Ely's test: R = *** degrees ; L = *** degrees   Popliteal Angle: R = *** degrees ; L = *** degrees   Ana's test: R = *** ; L = ***   Jameel test: R = *** ; L = ***    Edema: ***      Limitation/Restriction for FOTO *** Survey    Therapist reviewed FOTO scores for Javier Gama on 8/22/2022.   FOTO documents entered into [employer] - see Media section.    Limitation Score: ***%         TREATMENT     Total Treatment time (time-based codes) separate from Evaluation: *** minutes      Javier received the treatments listed below:      therapeutic exercises to develop {AMB PT PROGRESS OBJECTIVE:94476} for *** minutes including:  ***    manual therapy techniques: {AMB PT PROGRESS MANUAL THERAPY:49735} were applied to the: *** for *** minutes, including:  ***    neuromuscular re-education activities to improve: {AMB PT PROGRESS NEURO RE-ED:33786} for *** minutes. The following activities were included:  ***    therapeutic activities to improve functional performance for ***  minutes, including:  ***    gait training to improve functional mobility and safety for ***  minutes, including:  ***    direct contact modalities after being cleared for contraindications: {AMB PT PROGRESS DIRECT CONTACT MODES:79486}    supervised modalities after being cleared for contradictions: {AMB PT SUPERVISED MODES:57035}    hot pack for *** minutes to ***.    cold pack for *** minutes to ***.      PATIENT EDUCATION AND HOME EXERCISES     Education provided:   - ***    Written Home Exercises Provided:  "{Blank single:93069::"yes","Patient instructed to cont prior HEP"}. Exercises were reviewed and Javier was able to demonstrate them prior to the end of the session.  Javier demonstrated {Desc; good/fair/poor:72814} understanding of the education provided. See EMR under Patient Instructions for exercises provided during therapy sessions.    ASSESSMENT     Javier is a 37 y.o. male referred to outpatient Physical Therapy with a medical diagnosis of ***. Patient presents with ***    Patient prognosis is {REHAB PROGNOSIS OHS:88992}.   Patient will benefit from skilled outpatient Physical Therapy to address the deficits stated above and in the chart below, provide patient /family education, and to maximize patientt's level of independence.     Plan of care discussed with patient: {YES:27430}  Patient's spiritual, cultural and educational needs considered and patient is agreeable to the plan of care and goals as stated below:     Anticipated Barriers for therapy: ***    Medical Necessity is demonstrated by the following  History  Co-morbidities and personal factors that may impact the plan of care Co-morbidities:   {Co-morbidities:34873}    Personal Factors:   {Personal Factors:14168}     {Desc; low/moderate/high:600800}   Examination  Body Structures and Functions, activity limitations and participation restrictions that may impact the plan of care Body Regions:   {Body Regions:85854}    Body Systems:    {Body Systems:84748}    Participation Restrictions:   ***    Activity limitations:   Learning and applying knowledge  {Learning and applying knowledge:70586}    General Tasks and Commands  {Gen tasks and commands:30354}    Communication  {Communication:19841}    Mobility  {Mobility:31202}    Self care  {Self Care:04172}    Domestic Life  {Domestic Life:21815}    Interactions/Relationships  {Interactions/Relationships:33553}    Life Areas  {Life Areas:65944}    Community and Social Life  {Community/Social " "Life:62186}         {Desc; low/moderate/high:820756}   Clinical Presentation {Clinical Presentation :82768} {Desc; low/moderate/high:425061}   Decision Making/ Complexity Score: {Desc; low/moderate/high:995462}     Goals:  Short Term Goals (4 Weeks):   1. Patient to have improved *** dorsiflexion PROM by 25% or greater for ease with ADL's such as stair descent  2. Patient to have improved single limb balance as noted by 10 sec or greater ea LE for improved gait stability   3. Patient to report decreased pain in *** by 40% or greater for independence with ADL's  Long Term Goals (8 Weeks):   1. Patient to have decreased subjective report of disability as noted by a score of ***% or less on the FOTO ankle/foot questionnaire   2. Patient to be independent with home exercise program for improved self management of condition  3. Patient will increased *** ankle strength to ***/5 or greater for independence with ADL's such as squatting      PLAN   Plan of care Certification: 8/22/2022 to ***.    Outpatient Physical Therapy {NUMBERS 1-5:68086} times weekly for {0-10:63953::"0"} weeks to include the following interventions: {TX PLAN:99215}.     Leah Magallanes PT      I CERTIFY THE NEED FOR THESE SERVICES FURNISHED UNDER THIS PLAN OF TREATMENT AND WHILE UNDER MY CARE   Physician's comments:     Physician's Signature: ___________________________________________________     "

## 2022-08-22 NOTE — PROGRESS NOTES
Physical Therapy Treatment Note    Patient was scheduled for physical therapy at [facility] for 8/22/2022. Pt failed to appear for appointment without prior notification for today.     Leah Magallanes, PT

## 2022-08-23 PROBLEM — M25.672 DECREASED RANGE OF MOTION OF BOTH ANKLES: Status: ACTIVE | Noted: 2022-08-23

## 2022-08-23 PROBLEM — M25.671 DECREASED RANGE OF MOTION OF BOTH ANKLES: Status: ACTIVE | Noted: 2022-08-23

## 2022-08-23 NOTE — PLAN OF CARE
"[facility]  Physical Therapy Initial Evaluation    Name: Javier Gama  Clinic Number: [id number]    Therapy Diagnosis:   Encounter Diagnoses   Name Primary?    Chronic pain of both feet     Decreased range of motion of both ankles      Physician: Julianna Noyola DPM    Physician Orders: PT Eval and Treat   Medical Diagnosis: M79.671,G89.29,M79.672 (ICD-10-CM) - Chronic pain of both feet  Evaluation Date: 2022  Authorization Period Expiration: 2022  Plan of Care Certification Period: 10/17/2022  Visit # / Visits authorized:     Time In: 3:20 pm  Time Out: 3:57 pm  Total Billable Time: 15 minutes    Precautions: Standard    Subjective   Date of onset: chronic  History of current condition - Javier reports: he has pain in his feet for years and just tolerating it. The left is worse tan the R. States he purchase ASICS shoes as recommended by his podiatrist.        Past medical history: otherwise healthy  Javier Gama  has a past surgical history that includes Tonsillectomy.    Javier has a current medication list which includes the following prescription(s): ascorbic acid (vitamin c) and b complex vitamins.    Review of patient's allergies indicates:  No Known Allergies     Imagin2022  X-ray  FINDINGS:  The alignment is within normal limits.  No displaced fractures identified.  No evidence of lytic or blastic lesions.Joint spaces are unremarkable.Soft tissues are unremarkable.    Prior Therapy: none  Social History:  lives with their spouse. Single level home  Occupation: construction company  Prior Level of Function: I with amb and ADL  Current Level of Function: I with amb and ADL    Pain:  Current 7/10, worst 10/10, best 5/10   Location: bilateral feet   Description: burning  Aggravating Factors: morning, sitting, standing, and walking.  Easing Factors: deep massage    Pts goals: "Reduce the pain as much as I can."    Objective     Functional " assessment:   - walking: I  - sit to stand: I    AROM  LE MMT  R  L    Hip flexion  5/5  5/5    Hip abduction  5/5  5/5    Hip extension  5/5 5/5    Hip ER  5/5 5/5    Hip IR  5/5 5/5    Knee extension  5/5 5/5    Knee flexion  5/5 5/5    Ankle dorsiflexion  5/5 5/5    Ankle plantar flexion  5/5 5/5    Ankle inversion  5/5 5/5    Ankle eversion  5/5 5/5        Flexibility testing:  - hamstrings:           B: tight, R: -35 deg, L: -37 deg   - gastrocnemius:     B: tight. R: 4 deg, L: 3 deg  - piriformis:              B: tight, R: decreased 50%, L: decreased 25%  - quadriceps:          B: WFL  - hip adductors:       B: WNL  - hip flexors:             B: WNL  - IT bands:               B: WNL    Joint mobility:                      R               L  Ankle dorsiflexion            4 deg         3 deg  Ankle plantar flexion       65 deg       65 deg  Ankle inversion              37 deg       40 deg  Ankle eversion               20 deg       27 deg     R hallux extension: 20 deg  L hallux extension: 25 deg      CMS Impairment/Limitation/Restriction for FOTO Foot Survey    Therapist reviewed FOTO scores for Javier Gama on 8/22/2022.   FOTO documents entered into [employer] - see Media section.    Limitation Score: 12%  Category: Mobility    Current : CI = at least 1% but < 20% impaired, limited or restricted  Goal: CI = at least 1% but < 20% impaired, limited or restricted       TREATMENT   Treatment Time In: 3:42 pm  Treatment Time Out: 3:57 pm  Total Treatment time separate from Evaluation time: 15 minutes    Javier received therapeutic exercises to develop strength, ROM and flexibility for 15 minutes including:  Seated gastroc stretches 3 x 10 reps  Seated soleus stretches 3 x 10 reps  3 way standing heel raises x 12 reps  Soleus heel raises x 12 reps    Home Exercises Provided and Patient Education Provided     Education provided:   - Discussed the role of the PTA on the Rehab Team. Discussed patient will be  seen by a physical therapist minimally every 6th visit or every 30 days prior to being seen by PTA. Also discussed the use of the [facility] for communication.    Seated gastroc stretches  Seated soleus stretches  3 way standing heel raises  Soleus heel raises    Written Home Exercises Provided: yes.  Exercises were reviewed and Javier was able to demonstrate them prior to the end of the session.  Javier demonstrated good  understanding of the education provided.     See EMR under Patient Instructions for exercises provided 8/22/2022.      Assessment   Javier is a 37 y.o. male referred to outpatient Physical Therapy with a medical diagnosis of M79.671,G89.29,M79.672 (ICD-10-CM) - Chronic pain of both feet. Pt presents with decreased B ankle ROM and decreased LE flexibility contributing to B foot/plantar fascia pain. Will benefit from OP PT for ankle/foot strengthening, manual therapy, and dry needling to improve flexibility and ROM to progress to below listed goals.    Pt prognosis is Good.   Pt will benefit from skilled outpatient Physical Therapy to address the deficits stated above and in the chart below, provide pt/family education, and to maximize pt's level of independence.     Plan of care discussed with patient: Yes  Pt's spiritual, cultural and educational needs considered and patient is agreeable to the plan of care and goals as stated below:     Anticipated Barriers for therapy: none    Medical Necessity is demonstrated by the following  History  Co-morbidities and personal factors that may impact the plan of care Co-morbidities:   none    Personal Factors:   no deficits     low   Examination  Body Structures and Functions, activity limitations and participation restrictions that may impact the plan of care Body Regions:   lower extremities    Body Systems:    gross symmetry  ROM  strength  gross coordinated movement  balance  gait    Participation Restrictions:   none    Activity  limitations:   Learning and applying knowledge  no deficits    General Tasks and Commands  no deficits    Communication  no deficits    Mobility  lifting and carrying objects  walking  negotiating steps    Self care  no deficits    Domestic Life  no deficits    Interactions/Relationships  no deficits    Life Areas  no deficits    Community and Social Life  no deficits         moderate   Clinical Presentation stable and uncomplicated low   Decision Making/ Complexity Score: low     Goals:  Short Term Goals: 4 weeks   1. Independent with HEP.  2. Report decreased B foot pain < or =  5/10 with adls such as sitting, standing, and walking.  3. Report decreased B foot pain < or = 5/10 with first steps in the morning.      Long Term Goals: 8 weeks   4. Increased B dorsiflexion to 10 deg.  5. Report decreased B foot pain < or =  3/10 with adls such as sitting, standing, and walking.  6. Increased MMT for B LE by 1 muscle grade to promote proper pelvic stability to decrease    7. Increased flexibility in B hamstrings to -20 deg with 90/90 test to promote proper pelvic stability to decrease  B foot pain < or =  3/10 with adls such as sitting, standing, and walking.  8. Increased flexibility in B piriformis  to promote proper pelvic stability to decrease B foot pain < or =  3/10 with adls such as sitting, standing, and walking.  9. Patient to achieve CI (at least 1% but less than 20% impaired, limited or restricted) level at 11% functional limitation on the FOTO Outcomes Measurement System.    Plan   Certification Period/Plan of care expiration: 8/22/2022 to 10/17/2022.    Outpatient Physical Therapy 2 times weekly for 8 weeks to include the following interventions: Manual Therapy, Moist Heat/ Ice, Neuromuscular Re-ed, Patient Education, Therapeutic Activities, Therapeutic Exercise and Dry needling.     Sulaiman Marc, PT

## 2022-08-29 ENCOUNTER — CLINICAL SUPPORT (OUTPATIENT)
Dept: REHABILITATION | Facility: OTHER | Age: 37
End: 2022-08-29
Payer: MEDICAID

## 2022-08-29 DIAGNOSIS — M25.672 DECREASED RANGE OF MOTION OF BOTH ANKLES: Primary | ICD-10-CM

## 2022-08-29 DIAGNOSIS — M25.671 DECREASED RANGE OF MOTION OF BOTH ANKLES: Primary | ICD-10-CM

## 2022-08-29 PROCEDURE — 97110 THERAPEUTIC EXERCISES: CPT | Mod: PN,CQ

## 2022-08-29 NOTE — PROGRESS NOTES
Physical Therapy Daily Treatment Note     Name: Javier Gama  Clinic Number: [id number]    Therapy Diagnosis:   Encounter Diagnosis   Name Primary?    Decreased range of motion of both ankles Yes     Physician: Julianna Noyola DPM    Visit Date: 8/29/2022  Physician Orders: PT Eval and Treat   Medical Diagnosis: M79.671,G89.29,M79.672 (ICD-10-CM) - Chronic pain of both feet  Evaluation Date: 8/22/2022  Authorization Period Expiration: 8/4/2022  Plan of Care Certification Period: 10/17/2022  Visit # / Visits authorized: 2 1/ 1     Time In: 1151  Time Out: 1240  Total Billable Time:  41minutes     Precautions: Standard     Subjective    Pt states feeling okay with minimal to no pn in B feet.  Pt mentioned having increased pn in B feet when getting out of bed in the morning.  Pt states using a masage cream and rubbing his feet at night help reduce the pn.    Pt reports: he was somewhat compliant with home exercise program given last session.   Response to previous treatment:no adverse effects  Functional change: no change reported     Pain: not reported   Location: bilateral feet      Objective     Javier received therapeutic exercises to develop strength, endurance, ROM, flexibility, posture, and core stabilization for 49 minutes (41 min 1 on 1 w/ PTA) including:  Golf ball rolling 3 min ea   Toe flexion on towel 30 x B   Toe yoga 20 x ea B   Foot doming 2 x 10 3 sec hold   Seated gastroc stretches 3 x 10 reps  Seated soleus stretches 3 x 10 reps  3 way standing heel raises x 15 x ea  Soleus heel raises 2 x 10 reps    Home Exercises Provided and Patient Education Provided     Education provided:   - Pt edu on proper exercise technique and advised to use frozen water bottle at home to manage pn and tightness under both feet.      Written Home Exercises Provided: Patient instructed to cont prior HEP. Exercises were reviewed and Javier was able to demonstrate them prior to the end  of the session.  Javier demonstrated good  understanding of the education provided. See EMR under Patient Instructions for exercises provided during therapy sessions.      Assessment     Pt bindu tx well w/ no increase in pn.  Pt showed increased strength and endurance during therex.  Pt would cont to benefit from skilled care to increase ankle/ foot mobility and strength.    Javier Is progressing well towards his goals.   Pt prognosis is Good.     Pt will continue to benefit from skilled outpatient physical therapy to address the deficits listed in the problem list box on initial evaluation, provide pt/family education and to maximize pt's level of independence in the home and community environment.     Pt's spiritual, cultural and educational needs considered and pt agreeable to plan of care and goals.    Anticipated barriers to physical therapy: none    Goals: Goals:  Short Term Goals: 4 weeks   Independent with HEP. (Progressing, not met)   Report decreased B foot pain < or =  5/10 with adls such as sitting, standing, and walking.(Progressing, not met)   Report decreased B foot pain < or = 5/10 with first steps in the morning.  (Progressing, not met)      Long Term Goals: 8 weeks   Increased B dorsiflexion to 10 deg.  Report decreased B foot pain < or =  3/10 with adls such as sitting, standing, and walking.(Progressing, not met)   Increased MMT for B LE by 1 muscle grade to promote proper pelvic stability to decrease  (Progressing, not met)   Increased flexibility in B hamstrings to -20 deg with 90/90 test to promote proper pelvic stability to decrease  B foot pain < or =  3/10 with adls such as sitting, standing, and walking.(Progressing, not met)   Increased flexibility in B piriformis  to promote proper pelvic stability to decrease B foot pain < or =  3/10 with adls such as sitting, standing, and walking.(Progressing, not met)   Patient to achieve CI (at least 1% but less than 20% impaired, limited or  restricted) level at 11% functional limitation on the FOTO Outcomes Measurement System.(Progressing, not met)        Plan     Cont to progress towards goals set by PT.      Seth Galo, PTA

## 2022-08-31 ENCOUNTER — CLINICAL SUPPORT (OUTPATIENT)
Dept: REHABILITATION | Facility: OTHER | Age: 37
End: 2022-08-31
Payer: MEDICAID

## 2022-08-31 DIAGNOSIS — M25.672 DECREASED RANGE OF MOTION OF BOTH ANKLES: Primary | ICD-10-CM

## 2022-08-31 DIAGNOSIS — M25.671 DECREASED RANGE OF MOTION OF BOTH ANKLES: Primary | ICD-10-CM

## 2022-08-31 PROCEDURE — 97110 THERAPEUTIC EXERCISES: CPT | Mod: PN | Performed by: PHYSICAL THERAPIST

## 2022-08-31 NOTE — PROGRESS NOTES
"                            Physical Therapy Daily Treatment Note     Name: Javier Gama  Clinic Number: [id number]    Therapy Diagnosis:   Encounter Diagnosis   Name Primary?    Decreased range of motion of both ankles Yes     Physician: Julianna Noyola DPM    Visit Date: 8/31/2022  Physician Orders: PT Eval and Treat   Medical Diagnosis: M79.671,G89.29,M79.672 (ICD-10-CM) - Chronic pain of both feet  Evaluation Date: 8/22/2022  Authorization Period Expiration: 8/4/2022  Plan of Care Certification Period: 10/17/2022  Visit # / Visits authorized: 3 (2/ 20)     Time In: 1:10 pm  Time Out: 2:05 pm  Total Billable Time:  55 minutes     Precautions: Standard     Subjective    Pt states a good soreness since starting the exercises and feels they help. Pain not 100% improved and pain still worse in the morning. Pain eases up after 30 minutes or so after waking. Improvements in pain at night and no longer waking him. Also improvements in pain at work since switching his shoes.   Pt reports: he was somewhat compliant with home exercise program given last session.   Response to previous treatment:no adverse effects  Functional change: no change reported     Pain: 8/10 in AM, none currently   Location: bilateral feet      Objective     Javier received therapeutic exercises to develop strength, endurance, ROM, flexibility, posture, and core stabilization for 40 minutes including:    Golf ball rolling 3 min ea   Toe flexion on towel 3 min  Toe yoga 3 min  Foot doming 3 min  Seated gastroc stretches 3 x 30"  Seated soleus stretches 3 x 30"  +plantar fascia str 30" x 3  3 way standing heel raises x 15 x ea  Soleus heel raises 2 x 10 reps    Patient received 15 minutes manual therapy to B feet including:  iaSTM plantar fascia and achilles  Subtalar rocking, emphasis on eversion    Home Exercises Provided and Patient Education Provided     Education provided:   - Pt edu on proper exercise technique and advised to use frozen " water bottle at home to manage pn and tightness under both feet.      Written Home Exercises Provided: Patient instructed to cont prior HEP. Exercises were reviewed and Javier was able to demonstrate them prior to the end of the session.  Javier demonstrated good  understanding of the education provided. See EMR under Patient Instructions for exercises provided during therapy sessions.      Assessment     Pt tolerated treatment well. Good response to manual techniques with improved symptoms with dorsiflexion following. Added plantar fascia stretch to HEP to be completed in AM for relief of first step in morning pain.    Javier Is progressing well towards his goals.   Pt prognosis is Good.     Pt will continue to benefit from skilled outpatient physical therapy to address the deficits listed in the problem list box on initial evaluation, provide pt/family education and to maximize pt's level of independence in the home and community environment.     Pt's spiritual, cultural and educational needs considered and pt agreeable to plan of care and goals.    Anticipated barriers to physical therapy: none    Goals: Goals:  Short Term Goals: 4 weeks   Independent with HEP. (Progressing, not met)   Report decreased B foot pain < or =  5/10 with adls such as sitting, standing, and walking.(Progressing, not met)   Report decreased B foot pain < or = 5/10 with first steps in the morning.  (Progressing, not met)      Long Term Goals: 8 weeks   Increased B dorsiflexion to 10 deg.  Report decreased B foot pain < or =  3/10 with adls such as sitting, standing, and walking.(Progressing, not met)   Increased MMT for B LE by 1 muscle grade to promote proper pelvic stability to decrease  (Progressing, not met)   Increased flexibility in B hamstrings to -20 deg with 90/90 test to promote proper pelvic stability to decrease  B foot pain < or =  3/10 with adls such as sitting, standing, and walking.(Progressing, not met)    Increased flexibility in B piriformis  to promote proper pelvic stability to decrease B foot pain < or =  3/10 with adls such as sitting, standing, and walking.(Progressing, not met)   Patient to achieve CI (at least 1% but less than 20% impaired, limited or restricted) level at 11% functional limitation on the FOTO Outcomes Measurement System.(Progressing, not met)        Plan     Continue manual therapy prn for symptom modulation.     Leah Magallanes, PT

## 2022-09-07 ENCOUNTER — CLINICAL SUPPORT (OUTPATIENT)
Dept: REHABILITATION | Facility: OTHER | Age: 37
End: 2022-09-07
Payer: MEDICAID

## 2022-09-07 DIAGNOSIS — M25.671 DECREASED RANGE OF MOTION OF BOTH ANKLES: Primary | ICD-10-CM

## 2022-09-07 DIAGNOSIS — M25.672 DECREASED RANGE OF MOTION OF BOTH ANKLES: Primary | ICD-10-CM

## 2022-09-07 PROCEDURE — 97140 MANUAL THERAPY 1/> REGIONS: CPT | Mod: PN

## 2022-09-07 NOTE — PROGRESS NOTES
Physical Therapy Daily Treatment Note     Name: Javier Gama  Clinic Number: [id number]    Therapy Diagnosis:   Encounter Diagnosis   Name Primary?    Decreased range of motion of both ankles Yes       Physician: Julianna Noyola DPM    Visit Date: 9/7/2022  Physician Orders: PT Eval and Treat   Medical Diagnosis: M79.671,G89.29,M79.672 (ICD-10-CM) - Chronic pain of both feet  Evaluation Date: 8/22/2022  Authorization Period Expiration: 8/4/2022  Plan of Care Certification Period: 10/17/2022  Visit # / Visits authorized: 4(3/ 20)  FOTO: 1/5 9/7/2022     Time In: 2:45 pm  Time Out: 3:27 pm  Total Billable Time:  32 minutes     Precautions: Standard     Subjective    Pt states a good soreness since starting the exercises and feels they help. Pain not 100% improved and pain still worse in the morning. Pain eases up after 30 minutes or so after waking. Improvements in pain at night and no longer waking him. Also improvements in pain at work since switching his shoes.     Pt reports: he was somewhat compliant with home exercise program given last session.   Response to previous treatment:no adverse effects  Functional change: no change reported     Pain: 8/10 in AM, none currently   Location: bilateral feet      Objective     CMS Impairment/Limitation/Restriction for FOTO Foot Survey    Therapist reviewed FOTO scores for Javier Gama on 9/7/2022.   FOTO documents entered into [employer] - see Media section.    Limitation Score: 1%  Category: Mobility    Current : CI = at least 1% but < 20% impaired, limited or restricted  Goal: CI = at least 1% but < 20% impaired, limited or restricted         Javier received therapeutic exercises to develop strength, endurance, ROM, flexibility, posture, and core stabilization for 2 minutes including:    gastroc stretches x 1' on incline  soleus stretches x 1' on incline    Golf ball rolling 3 min ea   Toe flexion on towel 3 min  Toe yoga 3 min  Foot  "doming 3 min  plantar fascia str 30" x 3  3 way standing heel raises x 15 x ea  Soleus heel raises 2 x 10 reps    Patient received 30 minutes manual therapy to B feet including:    iaSTM plantar fascia and achilles  Subtalar rocking, emphasis on eversion    Application of TDN: Pt educated on benefits and potential side effects of dry needling. Educated pt on benefits, precautions, side effects following TDN. Educated pt to use heat following treatment sessions if pt is experiencing pain or soreness. Pt verbalized good understanding of education.  Pt signed written consent to dry needling. Pt gave verbal consent for DN    Pt received dry needling to the below listed muscles using 15 mm, 30 mm, 40 mm and 50 mm needles.  B media and lateral gastroc heads  B soleus  B proximal and lateral Achilles  B MCT  B quadratus plantae  B hallucis adductor    Winding performed every 5 minutes during treatment.     Home Exercises Provided and Patient Education Provided     Education provided:   - Reviewed beneftis and risks of dry needling. Pt agreed and signed consent form. Discussed the use of heat tonight if he experienced needle site pain.    Written Home Exercises Provided: Patient instructed to cont prior HEP. Exercises were reviewed and Javier was able to demonstrate them prior to the end of the session.  Javier demonstrated good  understanding of the education provided. See EMR under Patient Instructions for exercises provided during therapy sessions.      Assessment     Initiated dry needling today. Good soft tissue response to dry needling evident by increased grasp with unilateral winding at all insertion points. Winding performed every 5 minutes during treatment. No adverse effects following treatment. May benefit from elizabeth splint. Main foot pain is in the morning when he wakes up.    Javier Is progressing well towards his goals.   Pt prognosis is Good.     Pt will continue to benefit from skilled " outpatient physical therapy to address the deficits listed in the problem list box on initial evaluation, provide pt/family education and to maximize pt's level of independence in the home and community environment.     Pt's spiritual, cultural and educational needs considered and pt agreeable to plan of care and goals.    Anticipated barriers to physical therapy: none    Goals: Goals:  Short Term Goals: 4 weeks   Independent with HEP. (Progressing, not met)   Report decreased B foot pain < or =  5/10 with adls such as sitting, standing, and walking.(Progressing, not met)   Report decreased B foot pain < or = 5/10 with first steps in the morning.  (Progressing, not met)      Long Term Goals: 8 weeks   Increased B dorsiflexion to 10 deg.  Report decreased B foot pain < or =  3/10 with adls such as sitting, standing, and walking.(Progressing, not met)   Increased MMT for B LE by 1 muscle grade to promote proper pelvic stability to decrease  (Progressing, not met)   Increased flexibility in B hamstrings to -20 deg with 90/90 test to promote proper pelvic stability to decrease  B foot pain < or =  3/10 with adls such as sitting, standing, and walking.(Progressing, not met)   Increased flexibility in B piriformis  to promote proper pelvic stability to decrease B foot pain < or =  3/10 with adls such as sitting, standing, and walking.(Progressing, not met)   Patient to achieve CI (at least 1% but less than 20% impaired, limited or restricted) level at 11% functional limitation on the FOTO Outcomes Measurement System.(Progressing, not met)        Plan     Continue manual therapy prn for symptom modulation.     Sulaiman Marc, PT

## 2022-09-12 ENCOUNTER — CLINICAL SUPPORT (OUTPATIENT)
Dept: REHABILITATION | Facility: OTHER | Age: 37
End: 2022-09-12
Payer: MEDICAID

## 2022-09-12 DIAGNOSIS — M25.672 DECREASED RANGE OF MOTION OF BOTH ANKLES: Primary | ICD-10-CM

## 2022-09-12 DIAGNOSIS — M25.671 DECREASED RANGE OF MOTION OF BOTH ANKLES: Primary | ICD-10-CM

## 2022-09-12 PROCEDURE — 97110 THERAPEUTIC EXERCISES: CPT | Mod: PN

## 2022-09-12 NOTE — PROGRESS NOTES
"                            Physical Therapy Daily Treatment Note     Name: Javier Gama  Clinic Number: [id number]    Therapy Diagnosis:   Encounter Diagnosis   Name Primary?    Decreased range of motion of both ankles Yes       Physician: Julianna Noyola DPM    Visit Date: 9/12/2022  Physician Orders: PT Eval and Treat   Medical Diagnosis: M79.671,G89.29,M79.672 (ICD-10-CM) - Chronic pain of both feet  Evaluation Date: 8/22/2022  Authorization Period Expiration: 8/4/2022  Plan of Care Certification Period: 10/17/2022  Visit # / Visits authorized: 5(4/ 20)  FOTO: 2/5 9/7/2022     Time In: 1145  Time Out:1230  Total Billable Time:  30 minutes     Precautions: Standard     Subjective    Pt reports feeling better w/ mild pn in B feet.  Pt mentioned feeling better since last session.      Pt reports: he was somewhat compliant with home exercise program given last session.   Response to previous treatment:no adverse effects  Functional change: increased mobility     Pain: 8/10 in AM, none currently   Location: bilateral feet      Objective       Javier received therapeutic exercises to develop strength, endurance, ROM, flexibility, posture, and core stabilization for 20 minutes including:    gastroc stretches x 1' on incline  soleus stretches x 1' on incline    Golf ball rolling 3 min ea   Toe flexion on towel 3 min  Toe yoga 3 min  Foot doming 3 min  plantar fascia str 30" x 3  3 way standing heel raises x 20 x ea  Soleus heel raises 20 x reps    Patient received 10 minutes manual therapy to B feet including:  iaSTM B plantar fascia for 10 min     Subtalar rocking, emphasis on eversion    DN performed by Felipe Marc PT for 15 minutes     Application of TDN: Pt educated on benefits and potential side effects of dry needling. Educated pt on benefits, precautions, side effects following TDN. Educated pt to use heat following treatment sessions if pt is experiencing pain or soreness. Pt verbalized good understanding " of education.  Pt signed written consent to dry needling. Pt gave verbal consent for DN    Pt received dry needling to the below listed muscles using 15 mm, 30 mm, 40 mm and 50 mm needles.  B media and lateral gastroc heads  B soleus  B proximal and lateral Achilles  B MCT  B quadratus plantae  B hallucis adductor    Winding performed every 5 minutes during treatment.     Home Exercises Provided and Patient Education Provided     Education provided:   - Pt edu on proper exercise technique.      Written Home Exercises Provided: Patient instructed to cont prior HEP. Exercises were reviewed and Javier was able to demonstrate them prior to the end of the session.  Javier demonstrated good  understanding of the education provided. See EMR under Patient Instructions for exercises provided during therapy sessions.      Assessment   Pt displayed increased endurance during therex.  Pt cont to have some calf weakness and limited ankle mobility.  Pt had no adverse effects from tx.      Javier Is progressing well towards his goals.   Pt prognosis is Good.     Pt will continue to benefit from skilled outpatient physical therapy to address the deficits listed in the problem list box on initial evaluation, provide pt/family education and to maximize pt's level of independence in the home and community environment.     Pt's spiritual, cultural and educational needs considered and pt agreeable to plan of care and goals.    Anticipated barriers to physical therapy: none    Goals: Goals:  Short Term Goals: 4 weeks   Independent with HEP. (Progressing, not met)   Report decreased B foot pain < or =  5/10 with adls such as sitting, standing, and walking.(Progressing, not met)   Report decreased B foot pain < or = 5/10 with first steps in the morning.  (Progressing, not met)      Long Term Goals: 8 weeks   Increased B dorsiflexion to 10 deg.  Report decreased B foot pain < or =  3/10 with adls such as sitting, standing, and  walking.(Progressing, not met)   Increased MMT for B LE by 1 muscle grade to promote proper pelvic stability to decrease  (Progressing, not met)   Increased flexibility in B hamstrings to -20 deg with 90/90 test to promote proper pelvic stability to decrease  B foot pain < or =  3/10 with adls such as sitting, standing, and walking.(Progressing, not met)   Increased flexibility in B piriformis  to promote proper pelvic stability to decrease B foot pain < or =  3/10 with adls such as sitting, standing, and walking.(Progressing, not met)   Patient to achieve CI (at least 1% but less than 20% impaired, limited or restricted) level at 11% functional limitation on the FOTO Outcomes Measurement System.(Progressing, not met)        Plan     Cont to progress towards goals set by PT.  Work to increase ankle mobility/strength and hamstring/ gastroc flexibility.      Seth Galo, PTA

## 2022-09-12 NOTE — PROGRESS NOTES
"                            Physical Therapy Daily Treatment Note     Name: Javier Gama  Clinic Number: [id number]    Therapy Diagnosis:   Encounter Diagnosis   Name Primary?    Decreased range of motion of both ankles Yes       Physician: Julianna Noyola DPM    Visit Date: 9/12/2022  Physician Orders: PT Eval and Treat   Medical Diagnosis: M79.671,G89.29,M79.672 (ICD-10-CM) - Chronic pain of both feet  Evaluation Date: 8/22/2022  Authorization Period Expiration: 8/4/2022  Plan of Care Certification Period: 10/17/2022  Visit # / Visits authorized: 5 (4/20)  FOTO: 2/5 9/7/2022     Time In:  11:55 am  Time Out: 12:10 pm  Total Billable Time:  15 minutes     Precautions: Standard     Subjective   Pt reports dry needling was beneficial for him.    Pt reports: he was somewhat compliant with home exercise program given last session.   Response to previous treatment: no adverse effects  Functional change: less pain in the morning.    Pain: 8/10 in AM, none currently   Location: bilateral feet      Objective     CMS Impairment/Limitation/Restriction for FOTO Foot Survey    Therapist reviewed FOTO scores for Javier Gama on 9/12/2022.   FOTO documents entered into [employer] - see Media section.    Limitation Score: 1%  Category: Mobility    Current : CI = at least 1% but < 20% impaired, limited or restricted  Goal: CI = at least 1% but < 20% impaired, limited or restricted     Seen by MARTINE Galo for there ex. Please refer to his note for details.    Javier received therapeutic exercises to develop strength, endurance, ROM, flexibility, posture, and core stabilization for 00 minutes including:    gastroc stretches x 1' on incline  soleus stretches x 1' on incline    Golf ball rolling 3 min ea   Toe flexion on towel 3 min  Toe yoga 3 min  Foot doming 3 min  plantar fascia str 30" x 3  3 way standing heel raises x 15 x ea  Soleus heel raises 2 x 10 reps    Patient received 15 minutes manual therapy to B feet " including:    iaSTM plantar fascia and achilles  Subtalar rocking, emphasis on eversion    Application of TDN: Pt educated on benefits and potential side effects of dry needling. Educated pt on benefits, precautions, side effects following TDN. Educated pt to use heat following treatment sessions if pt is experiencing pain or soreness. Pt verbalized good understanding of education.  Pt signed written consent to dry needling. Pt gave verbal consent for DN    Pt received dry needling to the below listed muscles using 15 mm, 30 mm, 40 mm and 50 mm needles.  B media and lateral gastroc heads  B soleus  B proximal and lateral Achilles  B MCT  B quadratus plantae  B hallucis adductor    Winding performed every 5 minutes during treatment.     Home Exercises Provided and Patient Education Provided     Education provided:   - Reviewed beneftis and risks of dry needling. Pt agreed and signed consent form. Discussed the use of heat tonight if he experienced needle site pain.    Written Home Exercises Provided: Patient instructed to cont prior HEP. Exercises were reviewed and Javier was able to demonstrate them prior to the end of the session.  Javier demonstrated good  understanding of the education provided. See EMR under Patient Instructions for exercises provided during therapy sessions.      Assessment     Continued with dry needling today.     Javier Is progressing well towards his goals.   Pt prognosis is Good.     Pt will continue to benefit from skilled outpatient physical therapy to address the deficits listed in the problem list box on initial evaluation, provide pt/family education and to maximize pt's level of independence in the home and community environment.     Pt's spiritual, cultural and educational needs considered and pt agreeable to plan of care and goals.    Anticipated barriers to physical therapy: none    Goals: Goals:  Short Term Goals: 4 weeks   Independent with HEP. (Progressing, not met)    Report decreased B foot pain < or =  5/10 with adls such as sitting, standing, and walking.(Progressing, not met)   Report decreased B foot pain < or = 5/10 with first steps in the morning.  (Progressing, not met)      Long Term Goals: 8 weeks   Increased B dorsiflexion to 10 deg.  Report decreased B foot pain < or =  3/10 with adls such as sitting, standing, and walking.(Progressing, not met)   Increased MMT for B LE by 1 muscle grade to promote proper pelvic stability to decrease  (Progressing, not met)   Increased flexibility in B hamstrings to -20 deg with 90/90 test to promote proper pelvic stability to decrease  B foot pain < or =  3/10 with adls such as sitting, standing, and walking.(Progressing, not met)   Increased flexibility in B piriformis  to promote proper pelvic stability to decrease B foot pain < or =  3/10 with adls such as sitting, standing, and walking.(Progressing, not met)   Patient to achieve CI (at least 1% but less than 20% impaired, limited or restricted) level at 11% functional limitation on the FOTO Outcomes Measurement System.(Progressing, not met)        Plan     Continue manual therapy prn for symptom modulation.     Sulaiman Marc, PT

## 2022-09-14 ENCOUNTER — CLINICAL SUPPORT (OUTPATIENT)
Dept: REHABILITATION | Facility: OTHER | Age: 37
End: 2022-09-14
Payer: MEDICAID

## 2022-09-14 DIAGNOSIS — M25.671 DECREASED RANGE OF MOTION OF BOTH ANKLES: Primary | ICD-10-CM

## 2022-09-14 DIAGNOSIS — M25.672 DECREASED RANGE OF MOTION OF BOTH ANKLES: Primary | ICD-10-CM

## 2022-09-14 PROCEDURE — 97110 THERAPEUTIC EXERCISES: CPT | Mod: PN | Performed by: PHYSICAL THERAPIST

## 2022-09-14 NOTE — PROGRESS NOTES
"                            Physical Therapy Daily Treatment Note     Name: Javier Gama  Clinic Number: [id number]    Therapy Diagnosis:   Encounter Diagnosis   Name Primary?    Decreased range of motion of both ankles Yes       Physician: Julianna Noyola DPM    Visit Date: 9/14/2022  Physician Orders: PT Eval and Treat   Medical Diagnosis: M79.671,G89.29,M79.672 (ICD-10-CM) - Chronic pain of both feet  Evaluation Date: 8/22/2022  Authorization Period Expiration: 8/4/2022  Plan of Care Certification Period: 10/17/2022  Visit # / Visits authorized: 5(4/ 20)  FOTO: 2/5 9/7/2022     Time In: 1:15 pm  Time Out:2:05 pm  Total Billable Time:  50 minutes     Precautions: Standard     Subjective    Pt reports the dry needling helps, but Monday was the last time he'd like to do it. The needles are too painful to put in. Reports the stretches working well.     Pt reports: he was somewhat compliant with home exercise program given last session.   Response to previous treatment:no adverse effects  Functional change: increased mobility     Pain: 8/10 in AM, none currently   Location: bilateral feet      Objective       Javier received therapeutic exercises to develop strength, endurance, ROM, flexibility, posture, and core stabilization for 35 minutes including:    gastroc stretches x 1' on incline  soleus stretches x 1' on incline    Golf ball rolling 3 min ea   Toe flexion on towel 3 min  Toe yoga 3 min  Foot doming 3 min  plantar fascia str 30" x 3 (with towel)  3 way standing heel raises x 20 x ea  Soleus heel raises 20 x reps    Patient received 15 minutes manual therapy to B feet including:  iaSTM B plantar fascia   AP talocrural JM grade III    Home Exercises Provided and Patient Education Provided     Education provided:   - Pt edu on proper exercise technique.      Written Home Exercises Provided: Patient instructed to cont prior HEP. Exercises were reviewed and Javier was able to demonstrate them prior to " the end of the session.  Javier demonstrated good  understanding of the education provided. See EMR under Patient Instructions for exercises provided during therapy sessions.      Assessment   Pt with equinus type foot with limited dorsiflexion. Good response to manual techniques with improvements following. Demonstrating good compliance with HEP and eager to improve condition.     Javier Is progressing well towards his goals.   Pt prognosis is Good.     Pt will continue to benefit from skilled outpatient physical therapy to address the deficits listed in the problem list box on initial evaluation, provide pt/family education and to maximize pt's level of independence in the home and community environment.     Pt's spiritual, cultural and educational needs considered and pt agreeable to plan of care and goals.    Anticipated barriers to physical therapy: none    Goals: Goals:  Short Term Goals: 4 weeks   Independent with HEP. (Progressing, not met)   Report decreased B foot pain < or =  5/10 with adls such as sitting, standing, and walking.(Progressing, not met)   Report decreased B foot pain < or = 5/10 with first steps in the morning.  (Progressing, not met)      Long Term Goals: 8 weeks   Increased B dorsiflexion to 10 deg.  Report decreased B foot pain < or =  3/10 with adls such as sitting, standing, and walking.(Progressing, not met)   Increased MMT for B LE by 1 muscle grade to promote proper pelvic stability to decrease  (Progressing, not met)   Increased flexibility in B hamstrings to -20 deg with 90/90 test to promote proper pelvic stability to decrease  B foot pain < or =  3/10 with adls such as sitting, standing, and walking.(Progressing, not met)   Increased flexibility in B piriformis  to promote proper pelvic stability to decrease B foot pain < or =  3/10 with adls such as sitting, standing, and walking.(Progressing, not met)   Patient to achieve CI (at least 1% but less than 20% impaired,  limited or restricted) level at 11% functional limitation on the FOTO Outcomes Measurement System.(Progressing, not met)        Plan     Cont to progress towards goals set bat eval.  Work to increase ankle mobility/strength and hamstring/ gastroc flexibility.      Leah Magallanes, PT

## 2022-09-19 ENCOUNTER — CLINICAL SUPPORT (OUTPATIENT)
Dept: REHABILITATION | Facility: OTHER | Age: 37
End: 2022-09-19
Payer: MEDICAID

## 2022-09-19 DIAGNOSIS — M25.672 DECREASED RANGE OF MOTION OF BOTH ANKLES: Primary | ICD-10-CM

## 2022-09-19 DIAGNOSIS — M25.671 DECREASED RANGE OF MOTION OF BOTH ANKLES: Primary | ICD-10-CM

## 2022-09-19 PROCEDURE — 97110 THERAPEUTIC EXERCISES: CPT | Mod: PN,CQ

## 2022-09-19 NOTE — PROGRESS NOTES
"                            Physical Therapy Daily Treatment Note     Name: Javier Gama  Clinic Number: [id number]    Therapy Diagnosis:   Encounter Diagnosis   Name Primary?    Decreased range of motion of both ankles Yes       Physician: Julianna Noyola DPM    Visit Date: 9/19/2022  Physician Orders: PT Eval and Treat   Medical Diagnosis: M79.671,G89.29,M79.672 (ICD-10-CM) - Chronic pain of both feet  Evaluation Date: 8/22/2022  Authorization Period Expiration: 8/4/2022  Plan of Care Certification Period: 10/17/2022  Visit # / Visits authorized: 6(5/ 20)  FOTO: 3/5 9/7/2022     Time In: 1149  Time Out: 1230  Total Billable Time:  41 minutes     Precautions: Standard     Subjective    Pt states feeling better w/ less pn in B feet.  Pt mentioned only having pn when getting out of bed in the morning but notices improvement.      Pt reports: he was somewhat compliant with home exercise program given last session.   Response to previous treatment:no adverse effects  Functional change: improved mobility     Pain: 8/10 in AM, none currently   Location: bilateral feet      Objective       Javier received therapeutic exercises to develop strength, endurance, ROM, flexibility, posture, and core stabilization for 26 minutes including:    gastroc stretches x 1' on incline  soleus stretches x 1' on incline    Golf ball rolling 3 min ea   Toe flexion on towel 3 min  Toe yoga 3 min  Foot doming 3 min  plantar fascia str 30" x 3 (with towel)  3 way standing heel raises x 20 x ea  Soleus heel raises 20 x reps  Seated HR w/ 10# kettle bell 30 x ea   Ankle flips 2 x 20 ft     Patient received 15 minutes manual therapy to B feet including:  iaSTM B plantar fascia   AP talocrural JM grade III    Home Exercises Provided and Patient Education Provided     Education provided:   - Pt edu on proper exercise technique.      Written Home Exercises Provided: Patient instructed to cont prior HEP. Exercises were reviewed and Abdgermanhamid " was able to demonstrate them prior to the end of the session.  Javier demonstrated good  understanding of the education provided. See EMR under Patient Instructions for exercises provided during therapy sessions.      Assessment     Pt displayed increased tyson and ankle strength during tx.  Pt had no adverse effects from tx.  Pt would cont to benefit from skilled care to increase ankle mobility and strength.    Javier Is progressing well towards his goals.   Pt prognosis is Good.     Pt will continue to benefit from skilled outpatient physical therapy to address the deficits listed in the problem list box on initial evaluation, provide pt/family education and to maximize pt's level of independence in the home and community environment.     Pt's spiritual, cultural and educational needs considered and pt agreeable to plan of care and goals.    Anticipated barriers to physical therapy: none    Goals: Goals:  Short Term Goals: 4 weeks   Independent with HEP. (Progressing, not met)   Report decreased B foot pain < or =  5/10 with adls such as sitting, standing, and walking.(Progressing, not met)   Report decreased B foot pain < or = 5/10 with first steps in the morning.  (Progressing, not met)      Long Term Goals: 8 weeks   Increased B dorsiflexion to 10 deg.  Report decreased B foot pain < or =  3/10 with adls such as sitting, standing, and walking.(Progressing, not met)   Increased MMT for B LE by 1 muscle grade to promote proper pelvic stability to decrease  (Progressing, not met)   Increased flexibility in B hamstrings to -20 deg with 90/90 test to promote proper pelvic stability to decrease  B foot pain < or =  3/10 with adls such as sitting, standing, and walking.(Progressing, not met)   Increased flexibility in B piriformis  to promote proper pelvic stability to decrease B foot pain < or =  3/10 with adls such as sitting, standing, and walking.(Progressing, not met)   Patient to achieve CI (at least 1%  but less than 20% impaired, limited or restricted) level at 11% functional limitation on the FOTO Outcomes Measurement System.(Progressing, not met)        Plan     Cont to progress towards goals set bat eval.  Work to increase ankle mobility/strength and hamstring/ gastroc flexibility.      Seth Galo, PTA

## 2022-09-21 ENCOUNTER — CLINICAL SUPPORT (OUTPATIENT)
Dept: REHABILITATION | Facility: OTHER | Age: 37
End: 2022-09-21
Payer: MEDICAID

## 2022-09-21 DIAGNOSIS — M25.671 DECREASED RANGE OF MOTION OF BOTH ANKLES: Primary | ICD-10-CM

## 2022-09-21 DIAGNOSIS — M25.672 DECREASED RANGE OF MOTION OF BOTH ANKLES: Primary | ICD-10-CM

## 2022-09-21 PROCEDURE — 97140 MANUAL THERAPY 1/> REGIONS: CPT | Mod: PN

## 2022-09-21 PROCEDURE — 97110 THERAPEUTIC EXERCISES: CPT | Mod: PN

## 2022-09-21 NOTE — PROGRESS NOTES
"                            Physical Therapy Daily Treatment Note     Name: Javier Gama  Clinic Number: [id number]    Therapy Diagnosis:   Encounter Diagnosis   Name Primary?    Decreased range of motion of both ankles Yes       Physician: Julianna Noyola DPM    Visit Date: 9/21/2022  Physician Orders: PT Eval and Treat   Medical Diagnosis: M79.671,G89.29,M79.672 (ICD-10-CM) - Chronic pain of both feet  Evaluation Date: 8/22/2022  Authorization Period Expiration: 8/4/2022  Plan of Care Certification Period: 10/17/2022  Visit # / Visits authorized: 7 (6/ 20)  FOTO: 4/5 9/7/2022     Time In: 1:15 pm  Time Out: 2:00 pm  Total Billable Time:  45 minutes     Precautions: Standard     Subjective    Pt states his feet are"way better than before." States he still has pan in the mornings, but better.    Pt reports: he was somewhat compliant with home exercise program given last session.   Response to previous treatment:no adverse effects  Functional change: improved mobility     Pain: 5/10 in AM, none currently   Location: bilateral feet      Objective       Javier received therapeutic exercises to develop strength, endurance, ROM, flexibility, posture, and core stabilization for 35 minutes including:    Wobble board PF/DF x 30 reps  Wobble board Inv/ev x 30 rep    3 way standing heel raises x 20 x ea  Soleus heel raises 20 x reps  Toe raises    gastroc stretches x 1' on incline  soleus stretches x 1' on incline    Golf ball rolling 3 min ea   Toe flexion on towel 3 min  Toe yoga 3 min  Foot doming 3 min  plantar fascia str 30" x 3 (with towel)    Seated HR w/ 10# kettle bell 30 x ea   Ankle flips 2 x 40 ft     Patient received 10 minutes manual therapy to B feet including:  iaSTM B plantar fascia   AP talocrural JM grade III    Home Exercises Provided and Patient Education Provided     Education provided:   - Pt edu on proper exercise technique.      Written Home Exercises Provided: Patient instructed to cont prior " HEP. Exercises were reviewed and Javier was able to demonstrate them prior to the end of the session.  Javier demonstrated good  understanding of the education provided. See EMR under Patient Instructions for exercises provided during therapy sessions.      Assessment     Pt continues to experience morning heel pain, but that has been decreasing. Continues to benefit from manual therapy and balance training. May be a candidate for night splints if morning pain persists. He is otherwise progressing to his goals.      Javier Is progressing well towards his goals.   Pt prognosis is Good.     Pt will continue to benefit from skilled outpatient physical therapy to address the deficits listed in the problem list box on initial evaluation, provide pt/family education and to maximize pt's level of independence in the home and community environment.     Pt's spiritual, cultural and educational needs considered and pt agreeable to plan of care and goals.    Anticipated barriers to physical therapy: none    Goals: Goals:  Short Term Goals: 4 weeks   Independent with HEP. (Progressing, not met)   Report decreased B foot pain < or =  5/10 with adls such as sitting, standing, and walking.(Progressing, not met)   Report decreased B foot pain < or = 5/10 with first steps in the morning.  (Progressing, not met)      Long Term Goals: 8 weeks   Increased B dorsiflexion to 10 deg.  Report decreased B foot pain < or =  3/10 with adls such as sitting, standing, and walking.(Progressing, not met)   Increased MMT for B LE by 1 muscle grade to promote proper pelvic stability to decrease  (Progressing, not met)   Increased flexibility in B hamstrings to -20 deg with 90/90 test to promote proper pelvic stability to decrease  B foot pain < or =  3/10 with adls such as sitting, standing, and walking.(Progressing, not met)   Increased flexibility in B piriformis  to promote proper pelvic stability to decrease B foot pain < or =  3/10  with adls such as sitting, standing, and walking.(Progressing, not met)   Patient to achieve CI (at least 1% but less than 20% impaired, limited or restricted) level at 11% functional limitation on the FOTO Outcomes Measurement System.(Progressing, not met)        Plan     Cont to progress towards goals set bat eval.  Work to increase ankle mobility/strength and hamstring/ gastroc flexibility.      Sulaiman Marc, PT

## 2022-09-27 ENCOUNTER — CLINICAL SUPPORT (OUTPATIENT)
Dept: REHABILITATION | Facility: OTHER | Age: 37
End: 2022-09-27
Payer: MEDICAID

## 2022-09-27 DIAGNOSIS — M25.671 DECREASED RANGE OF MOTION OF BOTH ANKLES: Primary | ICD-10-CM

## 2022-09-27 DIAGNOSIS — M25.672 DECREASED RANGE OF MOTION OF BOTH ANKLES: Primary | ICD-10-CM

## 2022-09-27 PROCEDURE — 97110 THERAPEUTIC EXERCISES: CPT | Mod: PN,CQ

## 2022-09-27 PROCEDURE — 97140 MANUAL THERAPY 1/> REGIONS: CPT | Mod: PN,CQ

## 2022-09-27 NOTE — PROGRESS NOTES
"                            Physical Therapy Daily Treatment Note     Name: Javier Gama  Clinic Number: [id number]    Therapy Diagnosis:   Encounter Diagnosis   Name Primary?    Decreased range of motion of both ankles Yes         Physician: Julianna Noyola DPM    Visit Date: 9/27/2022  Physician Orders: PT Eval and Treat   Medical Diagnosis: M79.671,G89.29,M79.672 (ICD-10-CM) - Chronic pain of both feet  Evaluation Date: 8/22/2022  Authorization Period Expiration: 8/4/2022  Plan of Care Certification Period: 10/17/2022  Visit # / Visits authorized: 8 (7/ 20)  FOTO: 4/5 9/7/2022  PTA Visit # 1/5     Time In: 10:45 am  Time Out: 11:48 am  Total Billable Time:  63 minutes     Precautions: Standard     Subjective    Pt states he continues to feel progress after every treatment, but the first steps in the morning are still painful although the pain is getting better. He notes being able stand for longer before feeling the pain. Pt also states he uses a cream to help with the pain.    Pt reports: He was somewhat compliant with home exercise program given last session.   Response to previous treatment: "Felt better after last treatment"  Functional change: improved mobility     Pain: 4/10 L>R, 6/10 with first steps in AM  Location: bilateral feet      Objective       Javier received therapeutic exercises to develop strength, endurance, ROM, flexibility, posture, and core stabilization for 48 minutes including:    Wobble board PF/DF x 30 reps  Wobble board Inv/ev x 30 reps    3 way standing heel raises x 20 x ea  Soleus heel raises 20 x reps  Toe raises x 20    gastroc stretches x 1' on incline  soleus stretches x 1' on incline    Toe flexion on towel 3 min  Toe yoga 3 min  Foot doming 3 min  plantar fascia str 30" x 3 (with towel)  Golf ball rolling 3 min ea     Seated HR w/ 10# kettle bell 30 x ea   Ankle flips 2 x 40 ft     Patient received 15 minutes manual therapy to B feet including:    iaSTM B plantar fascia " and gastroc  AP talocrural JM grade III    Home Exercises Provided and Patient Education Provided     Education provided:   - Pt edu on completing HEP.      Written Home Exercises Provided: Patient instructed to cont prior HEP. Exercises were reviewed and Javier was able to demonstrate them prior to the end of the session.  Javier demonstrated good  understanding of the education provided. See EMR under Patient Instructions for exercises provided during therapy sessions.      Assessment     Pt tolerated therex and manual therapy with minimal increase in soreness.       Javier Is progressing well towards his goals.   Pt prognosis is Good.     Pt will continue to benefit from skilled outpatient physical therapy to address the deficits listed in the problem list box on initial evaluation, provide pt/family education and to maximize pt's level of independence in the home and community environment.     Pt's spiritual, cultural and educational needs considered and pt agreeable to plan of care and goals.    Anticipated barriers to physical therapy: none    Goals: Goals:  Short Term Goals: 4 weeks   Independent with HEP. (Progressing, not met)   Report decreased B foot pain < or = 5/10 with adls such as sitting, standing, and walking.(Progressing, not met)   Report decreased B foot pain < or = 5/10 with first steps in the morning.  (Progressing, not met)      Long Term Goals: 8 weeks   Increased B dorsiflexion to 10 deg.  Report decreased B foot pain < or =  3/10 with adls such as sitting, standing, and walking.(Progressing, not met)   Increased MMT for B LE by 1 muscle grade to promote proper pelvic stability to decrease  (Progressing, not met)   Increased flexibility in B hamstrings to -20 deg with 90/90 test to promote proper pelvic stability to decrease  B foot pain < or =  3/10 with adls such as sitting, standing, and walking.(Progressing, not met)   Increased flexibility in B piriformis  to promote proper  pelvic stability to decrease B foot pain < or =  3/10 with adls such as sitting, standing, and walking.(Progressing, not met)   Patient to achieve CI (at least 1% but less than 20% impaired, limited or restricted) level at 11% functional limitation on the FOTO Outcomes Measurement System.(Progressing, not met)        Plan     Continue to increase ankle mobility and strength, as well as hamstring and gastroc flexibility      Elaina Gutiérrez PTA       PT/PTA met face to face to discuss pt's treatment plan and progress towards established goals. Pt will be seen by a physical therapist minimally every 6th visit or every 30 days.      Sulaiman Marc, PT

## 2022-09-29 ENCOUNTER — CLINICAL SUPPORT (OUTPATIENT)
Dept: REHABILITATION | Facility: OTHER | Age: 37
End: 2022-09-29
Payer: MEDICAID

## 2022-09-29 DIAGNOSIS — M25.671 DECREASED RANGE OF MOTION OF BOTH ANKLES: Primary | ICD-10-CM

## 2022-09-29 DIAGNOSIS — M25.672 DECREASED RANGE OF MOTION OF BOTH ANKLES: Primary | ICD-10-CM

## 2022-09-29 PROCEDURE — 97110 THERAPEUTIC EXERCISES: CPT | Mod: PN | Performed by: PHYSICAL THERAPIST

## 2022-09-29 NOTE — PROGRESS NOTES
"                            Physical Therapy Daily Treatment Note     Name: Javier Gama  Clinic Number: [id number]    Therapy Diagnosis:   Encounter Diagnosis   Name Primary?    Decreased range of motion of both ankles Yes         Physician: Julianna Noyola DPM    Visit Date: 9/29/2022  Physician Orders: PT Eval and Treat   Medical Diagnosis: M79.671,G89.29,M79.672 (ICD-10-CM) - Chronic pain of both feet  Evaluation Date: 8/22/2022  Authorization Period Expiration: 8/4/2022  Plan of Care Certification Period: 10/17/2022  Visit # / Visits authorized: 10 (9/ 20)  FOTO: 4/5 9/7/2022  PTA Visit # 1/5     Time In: 10:45 am  Time Out: 11:45 am  Total Billable Time:  60 minutes     Precautions: Standard     Subjective    Pt states it's getting better and doing his exercises at home when he can.     Pt reports: He was somewhat compliant with home exercise program given last session.   Response to previous treatment: "Felt better after last treatment"  Functional change: improved mobility     Pain: 4/10 L>R, 6/10 with first steps in AM  Location: bilateral feet      Objective       Javier received therapeutic exercises to develop strength, endurance, ROM, flexibility, posture, and core stabilization for 35 minutes including:    Wobble board PF/DF x 2 min  Wobble board Inv/ev x 2 min    3 way standing heel raises x 20 x ea  Soleus heel raises 20 x reps  Toe raises x 20    gastroc stretches x 1' on incline  soleus stretches x 1' on incline    Toe flexion on towel 3 min  Toe yoga 2 min  Foot doming 2 min  plantar fascia str 30" x 2  Golf ball rolling 3 min ea     Seated HR w/ 10# kettle bell 30 x ea   Ankle flips 2 x 40 ft     Patient received 25 minutes manual therapy to B feet including:    iaSTM B plantar fascia and gastroc  AP and distraction talocrural JM grade III    Home Exercises Provided and Patient Education Provided     Education provided:   - Pt edu on completing HEP.      Written Home Exercises Provided: " Patient instructed to cont prior HEP. Exercises were reviewed and Javier was able to demonstrate them prior to the end of the session.  Javier demonstrated good  understanding of the education provided. See EMR under Patient Instructions for exercises provided during therapy sessions.      Assessment     Pt continued with decreased talocrural dorsiflexion and compensatory subtalar eversion with squatting. Good response to manual techniques with improved Range of Motion following.     Javier Is progressing well towards his goals.   Pt prognosis is Good.     Pt will continue to benefit from skilled outpatient physical therapy to address the deficits listed in the problem list box on initial evaluation, provide pt/family education and to maximize pt's level of independence in the home and community environment.     Pt's spiritual, cultural and educational needs considered and pt agreeable to plan of care and goals.    Anticipated barriers to physical therapy: none    Goals: Goals:  Short Term Goals: 4 weeks   Independent with HEP. (Progressing, not met)   Report decreased B foot pain < or = 5/10 with adls such as sitting, standing, and walking.(Progressing, not met)   Report decreased B foot pain < or = 5/10 with first steps in the morning.  (Progressing, not met)      Long Term Goals: 8 weeks   Increased B dorsiflexion to 10 deg.  Report decreased B foot pain < or =  3/10 with adls such as sitting, standing, and walking.(Progressing, not met)   Increased MMT for B LE by 1 muscle grade to promote proper pelvic stability to decrease  (Progressing, not met)   Increased flexibility in B hamstrings to -20 deg with 90/90 test to promote proper pelvic stability to decrease  B foot pain < or =  3/10 with adls such as sitting, standing, and walking.(Progressing, not met)   Increased flexibility in B piriformis  to promote proper pelvic stability to decrease B foot pain < or =  3/10 with adls such as sitting,  standing, and walking.(Progressing, not met)   Patient to achieve CI (at least 1% but less than 20% impaired, limited or restricted) level at 11% functional limitation on the FOTO Outcomes Measurement System.(Progressing, not met)        Plan     Continue to increase ankle mobility and strength, as well as hamstring and gastroc flexibility      Leah Magallanes, PT

## 2022-10-11 ENCOUNTER — CLINICAL SUPPORT (OUTPATIENT)
Dept: REHABILITATION | Facility: OTHER | Age: 37
End: 2022-10-11
Payer: MEDICAID

## 2022-10-11 DIAGNOSIS — M25.671 DECREASED RANGE OF MOTION OF BOTH ANKLES: Primary | ICD-10-CM

## 2022-10-11 DIAGNOSIS — M25.672 DECREASED RANGE OF MOTION OF BOTH ANKLES: Primary | ICD-10-CM

## 2022-10-11 PROCEDURE — 97110 THERAPEUTIC EXERCISES: CPT | Mod: PN | Performed by: PHYSICAL THERAPIST

## 2022-10-11 NOTE — PROGRESS NOTES
"                            Physical Therapy Daily Treatment Note     Name: Javier Gama  Clinic Number: [id number]    Therapy Diagnosis:   Encounter Diagnosis   Name Primary?    Decreased range of motion of both ankles Yes       Physician: Julianna Noyola DPM    Visit Date: 10/11/2022  Physician Orders: PT Eval and Treat   Medical Diagnosis: M79.671,G89.29,M79.672 (ICD-10-CM) - Chronic pain of both feet  Evaluation Date: 8/22/2022  Authorization Period Expiration: 8/4/2022  Plan of Care Certification Period: 10/17/2022  Visit # / Visits authorized: 11 (10/ 20)  FOTO: 5/5 9/7/2022- set up for next visit  PTA Visit # 0/5     Time In: 11:50 am  Time Out: 12:45 pm  Total Billable Time:  55 minutes     Precautions: Standard     Subjective    Pt states it's been working and doing better. Pain is same spot, worse on L heel.     Pt reports: He was somewhat compliant with home exercise program given last session.   Response to previous treatment: "Felt better after last treatment"  Functional change: improved mobility     Pain: 4/10 L>R, 6/10 with first steps in AM  Location: bilateral feet      Objective     LE MMT  R  L    Hip flexion  5/5  5/5    Hip abduction  5/5  5/5    Hip extension  5/5  5/5    Hip ER  5/5  5/5    Hip IR  5/5  5/5    Knee extension  5/5  5/5    Knee flexion  5/5  5/5    Ankle dorsiflexion  5/5  5/5    Ankle plantar flexion  5/5  5/5    Ankle inversion  5/5  5/5    Ankle eversion  5/5  5/5      Javier received therapeutic exercises to develop strength, endurance, ROM, flexibility, posture, and core stabilization for 30 minutes including:    Wobble board PF/DF x 2 min  Wobble board Inv/ev x 2 min    3 way standing heel raises x 20 x ea  Soleus heel raises 20 x reps  Toe raises x 20    gastroc stretches 3 x 30" on incline  soleus stretches x 1' on incline    Toe flexion on towel 3 min  Toe yoga 2 min- D/C to home  Foot doming 2 min- D/C to home  plantar fascia str 30" x 3 ea; " Prostretch    Seated HR w/ 10# marissatle bell 30 x ea   Ankle flips 2 x 40 ft     Patient received 25 minutes manual therapy to B feet including:    iaSTM B plantar fascia and gastroc  AP and distraction talocrural JM grade III    Home Exercises Provided and Patient Education Provided     Education provided:   - Pt edu on completing HEP.      Written Home Exercises Provided: Patient instructed to cont prior HEP. Exercises were reviewed and Javier was able to demonstrate them prior to the end of the session.  Javier demonstrated good  understanding of the education provided. See EMR under Patient Instructions for exercises provided during therapy sessions.      Assessment     Progressing plantar fascia stretch in weight bearing without exacerbation of symptoms. Education in frequent stretches throughout the day and first thing in morning to help improved Range of Motion and manage pain. No deficits in strength, balance, or gait at this time.     Javier Is progressing well towards his goals.   Pt prognosis is Good.     Pt will continue to benefit from skilled outpatient physical therapy to address the deficits listed in the problem list box on initial evaluation, provide pt/family education and to maximize pt's level of independence in the home and community environment.     Pt's spiritual, cultural and educational needs considered and pt agreeable to plan of care and goals.    Anticipated barriers to physical therapy: none    Goals: Goals:  Short Term Goals: 4 weeks   Independent with HEP. (Progressing, not met)   Report decreased B foot pain < or = 5/10 with adls such as sitting, standing, and walking.(Progressing, not met)   Report decreased B foot pain < or = 5/10 with first steps in the morning.  (Progressing, not met)      Long Term Goals: 8 weeks   Increased B dorsiflexion to 10 deg.  Report decreased B foot pain < or =  3/10 with adls such as sitting, standing, and walking.(Progressing, not met)    Increased MMT for B LE by 1 muscle grade to promote proper pelvic stability to decrease  (met)   Increased flexibility in B hamstrings to -20 deg with 90/90 test to promote proper pelvic stability to decrease  B foot pain < or =  3/10 with adls such as sitting, standing, and walking.(Progressing, not met)   Increased flexibility in B piriformis  to promote proper pelvic stability to decrease B foot pain < or =  3/10 with adls such as sitting, standing, and walking.(Progressing, not met)   Patient to achieve CI (at least 1% but less than 20% impaired, limited or restricted) level at 11% functional limitation on the FOTO Outcomes Measurement System.(Progressing, not met)        Plan     Continue to increase ankle mobility and strength, as well as hamstring and gastroc flexibility      Leah Magallanes, PT

## 2022-10-18 ENCOUNTER — CLINICAL SUPPORT (OUTPATIENT)
Dept: REHABILITATION | Facility: OTHER | Age: 37
End: 2022-10-18
Payer: MEDICAID

## 2022-10-18 DIAGNOSIS — M25.672 DECREASED RANGE OF MOTION OF BOTH ANKLES: Primary | ICD-10-CM

## 2022-10-18 DIAGNOSIS — M25.671 DECREASED RANGE OF MOTION OF BOTH ANKLES: Primary | ICD-10-CM

## 2022-10-18 PROCEDURE — 97110 THERAPEUTIC EXERCISES: CPT | Mod: PN | Performed by: PHYSICAL THERAPIST

## 2022-10-18 NOTE — PROGRESS NOTES
"                            Physical Therapy Daily Treatment Note/ Discharge Note     Name: Javier Gama  Clinic Number: [id number]    Therapy Diagnosis:   Encounter Diagnosis   Name Primary?    Decreased range of motion of both ankles Yes       Physician: Julianna Noyola DPM    Visit Date: 10/18/2022  Physician Orders: PT Eval and Treat   Medical Diagnosis: M79.671,G89.29,M79.672 (ICD-10-CM) - Chronic pain of both feet  Evaluation Date: 8/22/2022  Authorization Period Expiration: 8/4/2022  Plan of Care Certification Period: 10/17/2022  Visit # / Visits authorized: 11 (10/ 20)  FOTO: 10/18/2022  PTA Visit # 0/5     Time In: 11:30 am  Time Out: 12:10pm  Total Billable Time:  40 minutes     Precautions: Standard     Subjective    Pt states overall improved since beginning PT. His pain in the morning has lessened and only for 10 minutes, then it resolves. He has been doing his stretches, exercises, and self massage to manage his symptoms. Feels ready to discharge today.     Pt reports: He was somewhat compliant with home exercise program given last session.   Response to previous treatment: feels better each session with the scrapping   Functional change: pain no longer waking him at night    Pain: 2-3/10 L>R  Location: bilateral feet      Objective     LE MMT  R  L    Hip flexion  5/5  5/5    Hip abduction  5/5  5/5    Hip extension  5/5  5/5    Hip ER  5/5  5/5    Hip IR  5/5  5/5    Knee extension  5/5  5/5    Knee flexion  5/5  5/5    Ankle dorsiflexion  5/5  5/5    Ankle plantar flexion  5/5  5/5    Ankle inversion  5/5  5/5    Ankle eversion  5/5  5/5      Ankle passive DF 12 deg R, 10 deg L  HSS: -20 B    Javier received therapeutic exercises to develop strength, endurance, ROM, flexibility, posture, and core stabilization for 40 minutes including:    3 way standing heel raises x 20 x ea- added 10# KB  Soleus heel raises 20 x reps- reviewed for HEP  Toe raises x 20    gastroc stretches 3 x 30" on incline " "- reviewed for HEP  soleus stretches x 1' on incline    plantar fascia str 30" x 3 ea; Prostretch  Yogi toes- reviewed for HEP  Seated HR w/ 10# kettle bell 30 x ea   Ankle flips 2 x 40 ft       Home Exercises Provided and Patient Education Provided     Education provided:   - Pt edu on continued HEP and emphasis on stretches    Written Home Exercises Provided: Patient instructed to cont prior HEP. Exercises were reviewed and Javier was able to demonstrate them prior to the end of the session.  Javier demonstrated good  understanding of the education provided. See EMR under Patient Instructions for exercises provided during therapy sessions.      Assessment     Patient reporting minimal to no functional limitations due to his heel pain. Pt with improvements in pain, flexibility, and strength since beginning PT. Pt continues with first step in morning heel pain and recommended to f/u with podiatry following discharge. Pt able to independently manage symptoms with home program and demonstrating good understanding of exercises in HEP. Goals partially met.     Javier Is progressing well towards his goals.   Pt prognosis is Good.     Pt's spiritual, cultural and educational needs considered and pt agreeable to plan of care and goals.    Anticipated barriers to physical therapy: none    Goals: Goals:  Short Term Goals: 4 weeks   Independent with HEP. (met)   Report decreased B foot pain < or = 5/10 with adls such as sitting, standing, and walking.(met)   Report decreased B foot pain < or = 5/10 with first steps in the morning.  (met)      Long Term Goals: 8 weeks   Increased B dorsiflexion to 10 deg.  Report decreased B foot pain < or =  3/10 with adls such as sitting, standing, and walking.(Progressing, not met)   Increased MMT for B LE by 1 muscle grade to promote proper pelvic stability to decrease  (met)   Increased flexibility in B hamstrings to -20 deg with 90/90 test to promote proper pelvic stability " to decrease  B foot pain < or =  3/10 with adls such as sitting, standing, and walking.(met)   Increased flexibility in B piriformis  to promote proper pelvic stability to decrease B foot pain < or =  3/10 with adls such as sitting, standing, and walking.(met)   Patient to achieve CI (at least 1% but less than 20% impaired, limited or restricted) level at 11% functional limitation on the FOTO Outcomes Measurement System.(not met)        Plan     Patient is discharged from outpatient PT to [facility]. Pt to f/u with podiatry at completion of PT      Leah Magallanes, PT

## 2023-02-04 LAB
25(OH)D3+25(OH)D2 SERPL-MCNC: 70.8 NG/ML (ref 30–100)
CHOLEST SERPL-MCNC: 196 MG/DL (ref 100–199)
HDLC SERPL-MCNC: 52 MG/DL
IMP & REVIEW OF LAB RESULTS: NORMAL
LDLC SERPL CALC-MCNC: 129 MG/DL (ref 0–99)
TRIGL SERPL-MCNC: 83 MG/DL (ref 0–149)
VLDLC SERPL CALC-MCNC: 15 MG/DL (ref 5–40)

## 2023-02-07 ENCOUNTER — OFFICE VISIT (OUTPATIENT)
Dept: INTERNAL MEDICINE | Facility: CLINIC | Age: 38
End: 2023-02-07
Payer: MEDICAID

## 2023-02-07 VITALS
HEART RATE: 66 BPM | WEIGHT: 170.06 LBS | BODY MASS INDEX: 25.77 KG/M2 | HEIGHT: 68 IN | TEMPERATURE: 98 F | SYSTOLIC BLOOD PRESSURE: 114 MMHG | DIASTOLIC BLOOD PRESSURE: 76 MMHG

## 2023-02-07 DIAGNOSIS — E78.00 PURE HYPERCHOLESTEROLEMIA: Primary | ICD-10-CM

## 2023-02-07 DIAGNOSIS — E67.3 HYPERVITAMINOSIS D: ICD-10-CM

## 2023-02-07 DIAGNOSIS — I83.93 ASYMPTOMATIC VARICOSE VEINS OF BOTH LOWER EXTREMITIES: ICD-10-CM

## 2023-02-07 DIAGNOSIS — Z13.29 SCREENING FOR THYROID DISORDER: ICD-10-CM

## 2023-02-07 PROCEDURE — 1159F MED LIST DOCD IN RCRD: CPT | Mod: CPTII,S$GLB,, | Performed by: INTERNAL MEDICINE

## 2023-02-07 PROCEDURE — 99213 PR OFFICE/OUTPT VISIT, EST, LEVL III, 20-29 MIN: ICD-10-PCS | Mod: S$GLB,,, | Performed by: INTERNAL MEDICINE

## 2023-02-07 PROCEDURE — 3074F SYST BP LT 130 MM HG: CPT | Mod: CPTII,S$GLB,, | Performed by: INTERNAL MEDICINE

## 2023-02-07 PROCEDURE — 3078F DIAST BP <80 MM HG: CPT | Mod: CPTII,S$GLB,, | Performed by: INTERNAL MEDICINE

## 2023-02-07 PROCEDURE — 1160F RVW MEDS BY RX/DR IN RCRD: CPT | Mod: CPTII,S$GLB,, | Performed by: INTERNAL MEDICINE

## 2023-02-07 PROCEDURE — 3078F PR MOST RECENT DIASTOLIC BLOOD PRESSURE < 80 MM HG: ICD-10-PCS | Mod: CPTII,S$GLB,, | Performed by: INTERNAL MEDICINE

## 2023-02-07 PROCEDURE — 99213 OFFICE O/P EST LOW 20 MIN: CPT | Mod: S$GLB,,, | Performed by: INTERNAL MEDICINE

## 2023-02-07 PROCEDURE — 1159F PR MEDICATION LIST DOCUMENTED IN MEDICAL RECORD: ICD-10-PCS | Mod: CPTII,S$GLB,, | Performed by: INTERNAL MEDICINE

## 2023-02-07 PROCEDURE — 3008F PR BODY MASS INDEX (BMI) DOCUMENTED: ICD-10-PCS | Mod: CPTII,S$GLB,, | Performed by: INTERNAL MEDICINE

## 2023-02-07 PROCEDURE — 3008F BODY MASS INDEX DOCD: CPT | Mod: CPTII,S$GLB,, | Performed by: INTERNAL MEDICINE

## 2023-02-07 PROCEDURE — 3074F PR MOST RECENT SYSTOLIC BLOOD PRESSURE < 130 MM HG: ICD-10-PCS | Mod: CPTII,S$GLB,, | Performed by: INTERNAL MEDICINE

## 2023-02-07 PROCEDURE — 1160F PR REVIEW ALL MEDS BY PRESCRIBER/CLIN PHARMACIST DOCUMENTED: ICD-10-PCS | Mod: CPTII,S$GLB,, | Performed by: INTERNAL MEDICINE

## 2023-02-07 NOTE — PROGRESS NOTES
Chief C/o:    Follow-up (Lab results check.)        Health Care Maintenance    Health Maintenance         Date Due Completion Date    Hemoglobin A1c (Diabetic Prevention Screening) Never done ---    Influenza Vaccine (1) 09/01/2022 11/24/2020    Lipid Panel 02/03/2028 2/3/2023    TETANUS VACCINE 11/24/2030 11/24/2020                   HISTORY OF PRESENT ILLNESS:    STEFAN Gama is a 37 y.o. male who presents to the clinic today for Follow-up (Lab results check.)  . Patient is having no chest pain, no shortness of breath, no fever no chills.  Patient is having no side effects related to current medications.                  ALLERGIES AND MEDICATIONS: updated and reviewed.  Review of patient's allergies indicates:  No Known Allergies  Medication List with Changes/Refills   Current Medications    ASCORBIC ACID, VITAMIN C, (VITAMIN C) 500 MG TABLET    Take 500 mg by mouth once daily.   Discontinued Medications    B COMPLEX VITAMINS TABLET    Take 1 tablet by mouth once daily.       Problem List:  Patient Active Problem List   Diagnosis    Asymptomatic varicose veins of both lower extremities    History of COVID-19    Family history of diabetes mellitus in father    Pure hypercholesterolemia    BMI 26.0-26.9,adult    Chronic pain of both feet    Hypervitaminosis D         CARE TEAM:    Patient Care Team:  Judi Pearl MD as PCP - General (Internal Medicine)  Juany Quintanilla MD as Consulting Physician (Internal Medicine)         REVIEW OF SYSTEMS:    Review of Systems   Constitutional:  Negative for appetite change, chills, diaphoresis, fatigue, fever and unexpected weight change.   HENT:  Negative for congestion, drooling, ear discharge, ear pain, facial swelling, hearing loss, nosebleeds, rhinorrhea, sinus pain, sneezing, sore throat, tinnitus, trouble swallowing and voice change.    Eyes:  Negative for pain, discharge, redness, itching and visual disturbance.   Respiratory:  Negative for cough,  "choking, chest tightness, shortness of breath, wheezing and stridor.    Cardiovascular:  Negative for chest pain, palpitations and leg swelling.   Gastrointestinal:  Negative for abdominal distention, abdominal pain, blood in stool, constipation, diarrhea, nausea and vomiting.   Endocrine: Negative for cold intolerance, heat intolerance, polydipsia, polyphagia and polyuria.   Genitourinary:  Negative for difficulty urinating, dysuria, flank pain, frequency, hematuria and urgency.   Musculoskeletal:  Negative for arthralgias, back pain, gait problem, joint swelling, myalgias, neck pain and neck stiffness.   Skin:  Negative for color change, pallor, rash and wound.   Allergic/Immunologic: Negative for environmental allergies, food allergies and immunocompromised state.   Neurological:  Negative for dizziness, tremors, seizures, syncope, speech difficulty, weakness, light-headedness, numbness and headaches.   Hematological:  Negative for adenopathy. Does not bruise/bleed easily.   Psychiatric/Behavioral:  Negative for agitation, behavioral problems, confusion, decreased concentration, dysphoric mood, hallucinations, sleep disturbance and suicidal ideas. The patient is not nervous/anxious.        PHYSICAL EXAM:    Vitals:    02/07/23 0938   BP: 114/76   Pulse: 66   Temp: 97.7 °F (36.5 °C)     Weight: 77.2 kg (170 lb 1.4 oz)   Height: 5' 7.72" (172 cm)   Body mass index is 26.08 kg/m².  Vitals:    02/07/23 0938   BP: 114/76   Pulse: 66   Temp: 97.7 °F (36.5 °C)   TempSrc: Temporal   Weight: 77.2 kg (170 lb 1.4 oz)   Height: 5' 7.72" (1.72 m)   PainSc: 0-No pain          Physical Exam  Vitals and nursing note reviewed.   Constitutional:       General: He is not in acute distress.     Appearance: He is not ill-appearing, toxic-appearing or diaphoretic.   HENT:      Head: Normocephalic and atraumatic.      Right Ear: Tympanic membrane, ear canal and external ear normal. There is no impacted cerumen.      Left Ear: Tympanic " membrane, ear canal and external ear normal. There is no impacted cerumen.      Nose: Nose normal. No congestion or rhinorrhea.      Mouth/Throat:      Mouth: Mucous membranes are moist.      Pharynx: Oropharynx is clear. No oropharyngeal exudate or posterior oropharyngeal erythema.   Eyes:      General: No scleral icterus.        Right eye: No discharge.         Left eye: No discharge.      Extraocular Movements: Extraocular movements intact.      Conjunctiva/sclera: Conjunctivae normal.      Pupils: Pupils are equal, round, and reactive to light.   Cardiovascular:      Rate and Rhythm: Normal rate and regular rhythm.      Pulses: Normal pulses.      Heart sounds: Normal heart sounds. No murmur heard.    No friction rub. No gallop.   Pulmonary:      Effort: Pulmonary effort is normal. No respiratory distress.      Breath sounds: Normal breath sounds. No stridor. No wheezing, rhonchi or rales.   Chest:      Chest wall: No tenderness.   Abdominal:      General: Bowel sounds are normal. There is no distension.      Palpations: Abdomen is soft. There is no mass.      Tenderness: There is no abdominal tenderness. There is no guarding or rebound.      Hernia: No hernia is present.   Musculoskeletal:         General: No swelling, tenderness, deformity or signs of injury. Normal range of motion.      Cervical back: Normal range of motion and neck supple. No rigidity.      Right lower leg: No edema.      Left lower leg: No edema.   Lymphadenopathy:      Cervical: No cervical adenopathy.   Skin:     General: Skin is warm and dry.      Capillary Refill: Capillary refill takes less than 2 seconds.      Coloration: Skin is not jaundiced or pale.      Findings: No bruising, erythema, lesion or rash.   Neurological:      General: No focal deficit present.      Mental Status: He is alert and oriented to person, place, and time.      Cranial Nerves: No cranial nerve deficit.      Sensory: No sensory deficit.      Motor: No  weakness.      Coordination: Coordination normal.      Deep Tendon Reflexes: Reflexes normal.   Psychiatric:         Mood and Affect: Mood normal.         Behavior: Behavior normal.         Thought Content: Thought content normal.         Judgment: Judgment normal.          Labs:    Lab Results   Component Value Date    GLU 85 07/25/2022     07/25/2022    K 4.5 07/25/2022     07/25/2022    CO2 26 07/25/2022    BUN 14 07/25/2022    CREATININE 0.86 07/25/2022    CALCIUM 9.6 07/25/2022    ALBUMIN 4.8 07/25/2022    BILITOT 0.2 07/25/2022    AST 23 07/25/2022    ALT 26 07/25/2022     Lab Results   Component Value Date    WBC 6.6 07/25/2022    RBC 5.79 07/25/2022    HGB 14.5 07/25/2022    HCT 48.4 07/25/2022    MCV 84 07/25/2022    RDW 14.0 07/25/2022     07/25/2022      Lab Results   Component Value Date    CHOL 196 02/03/2023    TRIG 83 02/03/2023    HDL 52 02/03/2023    LDLCALC 129 (H) 02/03/2023     Lab Results   Component Value Date    TSH 1.380 07/25/2022     No results found for: HGBA1C, ESTIMATEDAVG   No components found for: MICROALBUMIN/CREATININE    ASSESSMENT & PLAN:    1. Pure hypercholesterolemia  - CBC Auto Differential; Future  - Comprehensive Metabolic Panel; Future  - Lipid Panel; Future  - CBC Auto Differential  - Comprehensive Metabolic Panel  - Lipid Panel  Mild hypercholesterolemia, patient is not concerning medication at this time, healthy diet and exercise and will recheck in 6 months.  2. BMI 26.0-26.9,adult   Healthy diet, exercise, and weight monitoring are essential for weight management.    Weight loss was discussed.  Ultimate goal is BMI below 25.     3. Hypervitaminosis D  Patient is of vitamin-D and his hypervitaminosis D is resolved, the level now is within normal limits, patient was advised that he may take 1000 units of vitamin-D a day per  4. Asymptomatic varicose veins of both lower extremities  5. Screening for thyroid disorder  - TSH; Future  - TSH              Orders Placed This Encounter   Procedures    CBC Auto Differential    Comprehensive Metabolic Panel    Lipid Panel    TSH      No follow-ups on file. or sooner as needed.    Patient was counseled and questions and concerns were addressed.    Please note:  Parts of this report were done using a dictation software, voice to text, and sometimes the text contains some uncorrected words or sentences that are missed during revision.

## 2023-08-09 LAB
ALBUMIN SERPL-MCNC: 5.1 G/DL (ref 4.1–5.1)
ALBUMIN/GLOB SERPL: 2.3 {RATIO} (ref 1.2–2.2)
ALP SERPL-CCNC: 57 IU/L (ref 44–121)
ALT SERPL-CCNC: 22 IU/L (ref 0–44)
AST SERPL-CCNC: 20 IU/L (ref 0–40)
BASOPHILS # BLD AUTO: 0 X10E3/UL (ref 0–0.2)
BASOPHILS NFR BLD AUTO: 1 %
BILIRUB SERPL-MCNC: 0.4 MG/DL (ref 0–1.2)
BUN SERPL-MCNC: 18 MG/DL (ref 6–24)
BUN/CREAT SERPL: 19 (ref 9–20)
CALCIUM SERPL-MCNC: 9.7 MG/DL (ref 8.7–10.2)
CHLORIDE SERPL-SCNC: 101 MMOL/L (ref 96–106)
CHOLEST SERPL-MCNC: 190 MG/DL (ref 100–199)
CO2 SERPL-SCNC: 26 MMOL/L (ref 20–29)
CREAT SERPL-MCNC: 0.96 MG/DL (ref 0.76–1.27)
EOSINOPHIL # BLD AUTO: 0.4 X10E3/UL (ref 0–0.4)
EOSINOPHIL NFR BLD AUTO: 6 %
ERYTHROCYTE [DISTWIDTH] IN BLOOD BY AUTOMATED COUNT: 14.1 % (ref 11.6–15.4)
EST. GFR  (NO RACE VARIABLE): 102 ML/MIN/1.73
GLOBULIN SER CALC-MCNC: 2.2 G/DL (ref 1.5–4.5)
GLUCOSE SERPL-MCNC: 98 MG/DL (ref 70–99)
HCT VFR BLD AUTO: 47.1 % (ref 37.5–51)
HDLC SERPL-MCNC: 46 MG/DL
HGB BLD-MCNC: 15.1 G/DL (ref 13–17.7)
IMM GRANULOCYTES # BLD AUTO: 0 X10E3/UL (ref 0–0.1)
IMM GRANULOCYTES NFR BLD AUTO: 0 %
IMP & REVIEW OF LAB RESULTS: NORMAL
LDLC SERPL CALC-MCNC: 120 MG/DL (ref 0–99)
LYMPHOCYTES # BLD AUTO: 2.6 X10E3/UL (ref 0.7–3.1)
LYMPHOCYTES NFR BLD AUTO: 39 %
MCH RBC QN AUTO: 27 PG (ref 26.6–33)
MCHC RBC AUTO-ENTMCNC: 32.1 G/DL (ref 31.5–35.7)
MCV RBC AUTO: 84 FL (ref 79–97)
MONOCYTES # BLD AUTO: 0.5 X10E3/UL (ref 0.1–0.9)
MONOCYTES NFR BLD AUTO: 8 %
NEUTROPHILS # BLD AUTO: 3.1 X10E3/UL (ref 1.4–7)
NEUTROPHILS NFR BLD AUTO: 46 %
PLATELET # BLD AUTO: 260 X10E3/UL (ref 150–450)
POTASSIUM SERPL-SCNC: 4.9 MMOL/L (ref 3.5–5.2)
PROT SERPL-MCNC: 7.3 G/DL (ref 6–8.5)
RBC # BLD AUTO: 5.6 X10E6/UL (ref 4.14–5.8)
SODIUM SERPL-SCNC: 140 MMOL/L (ref 134–144)
TRIGL SERPL-MCNC: 132 MG/DL (ref 0–149)
TSH SERPL DL<=0.005 MIU/L-ACNC: 1.24 UIU/ML (ref 0.45–4.5)
VLDLC SERPL CALC-MCNC: 24 MG/DL (ref 5–40)
WBC # BLD AUTO: 6.7 X10E3/UL (ref 3.4–10.8)

## 2023-08-10 ENCOUNTER — OFFICE VISIT (OUTPATIENT)
Dept: INTERNAL MEDICINE | Facility: CLINIC | Age: 38
End: 2023-08-10
Payer: MEDICAID

## 2023-08-10 VITALS
HEART RATE: 60 BPM | WEIGHT: 166.38 LBS | BODY MASS INDEX: 25.22 KG/M2 | TEMPERATURE: 97 F | DIASTOLIC BLOOD PRESSURE: 76 MMHG | SYSTOLIC BLOOD PRESSURE: 124 MMHG | HEIGHT: 68 IN

## 2023-08-10 DIAGNOSIS — E78.00 PURE HYPERCHOLESTEROLEMIA: Primary | ICD-10-CM

## 2023-08-10 DIAGNOSIS — M25.562 ACUTE PAIN OF LEFT KNEE: ICD-10-CM

## 2023-08-10 PROCEDURE — 1160F PR REVIEW ALL MEDS BY PRESCRIBER/CLIN PHARMACIST DOCUMENTED: ICD-10-PCS | Mod: CPTII,S$GLB,, | Performed by: INTERNAL MEDICINE

## 2023-08-10 PROCEDURE — 3008F PR BODY MASS INDEX (BMI) DOCUMENTED: ICD-10-PCS | Mod: CPTII,S$GLB,, | Performed by: INTERNAL MEDICINE

## 2023-08-10 PROCEDURE — 3078F DIAST BP <80 MM HG: CPT | Mod: CPTII,S$GLB,, | Performed by: INTERNAL MEDICINE

## 2023-08-10 PROCEDURE — 1159F MED LIST DOCD IN RCRD: CPT | Mod: CPTII,S$GLB,, | Performed by: INTERNAL MEDICINE

## 2023-08-10 PROCEDURE — 3074F SYST BP LT 130 MM HG: CPT | Mod: CPTII,S$GLB,, | Performed by: INTERNAL MEDICINE

## 2023-08-10 PROCEDURE — 3008F BODY MASS INDEX DOCD: CPT | Mod: CPTII,S$GLB,, | Performed by: INTERNAL MEDICINE

## 2023-08-10 PROCEDURE — 99213 OFFICE O/P EST LOW 20 MIN: CPT | Mod: S$GLB,,, | Performed by: INTERNAL MEDICINE

## 2023-08-10 PROCEDURE — 3078F PR MOST RECENT DIASTOLIC BLOOD PRESSURE < 80 MM HG: ICD-10-PCS | Mod: CPTII,S$GLB,, | Performed by: INTERNAL MEDICINE

## 2023-08-10 PROCEDURE — 1159F PR MEDICATION LIST DOCUMENTED IN MEDICAL RECORD: ICD-10-PCS | Mod: CPTII,S$GLB,, | Performed by: INTERNAL MEDICINE

## 2023-08-10 PROCEDURE — 3074F PR MOST RECENT SYSTOLIC BLOOD PRESSURE < 130 MM HG: ICD-10-PCS | Mod: CPTII,S$GLB,, | Performed by: INTERNAL MEDICINE

## 2023-08-10 PROCEDURE — 1160F RVW MEDS BY RX/DR IN RCRD: CPT | Mod: CPTII,S$GLB,, | Performed by: INTERNAL MEDICINE

## 2023-08-10 PROCEDURE — 99213 PR OFFICE/OUTPT VISIT, EST, LEVL III, 20-29 MIN: ICD-10-PCS | Mod: S$GLB,,, | Performed by: INTERNAL MEDICINE

## 2023-08-10 NOTE — PROGRESS NOTES
Chief C/o:    Hyperlipidemia (Lab results check.)        Health Care Maintenance    Health Maintenance         Date Due Completion Date    Hemoglobin A1c (Diabetic Prevention Screening) Never done ---    Influenza Vaccine (1) 09/01/2023 11/24/2020    Lipid Panel 08/08/2028 8/8/2023    TETANUS VACCINE 11/24/2030 11/24/2020                   HISTORY OF PRESENT ILLNESS:    STEFAN Gama is a 38 y.o. male who presents to the clinic today for Hyperlipidemia (Lab results check.)  .   Patient is having no chest pain, no shortness of breath, no fever no chills.  Patient is having no side effects related to current medications.                ALLERGIES AND MEDICATIONS: updated and reviewed.  Review of patient's allergies indicates:  No Known Allergies  Medication List with Changes/Refills   Current Medications    ASCORBIC ACID, VITAMIN C, (VITAMIN C) 500 MG TABLET    Take 500 mg by mouth once daily.    MAGNESIUM GLYCINATE ORAL    Take by mouth.       Problem List:  Patient Active Problem List   Diagnosis    Asymptomatic varicose veins of both lower extremities    History of COVID-19    Family history of diabetes mellitus in father    Pure hypercholesterolemia    BMI 25.0-25.9,adult    Chronic pain of both feet    Hypervitaminosis D    Acute pain of left knee/ sprain         CARE TEAM:    Patient Care Team:  Judi Pearl MD as PCP - General (Internal Medicine)  Juany Quintanilla MD as Consulting Physician (Internal Medicine)         REVIEW OF SYSTEMS:    Review of Systems   Constitutional:  Negative for appetite change, chills, diaphoresis, fatigue, fever and unexpected weight change.   HENT:  Negative for congestion, drooling, ear discharge, ear pain, facial swelling, hearing loss, nosebleeds, rhinorrhea, sinus pain, sneezing, sore throat, tinnitus, trouble swallowing and voice change.    Eyes:  Negative for pain, discharge, redness, itching and visual disturbance.   Respiratory:  Negative for cough, choking,  "chest tightness, shortness of breath, wheezing and stridor.    Cardiovascular:  Negative for chest pain, palpitations and leg swelling.   Gastrointestinal:  Negative for abdominal distention, abdominal pain, blood in stool, constipation, diarrhea, nausea and vomiting.   Endocrine: Negative for cold intolerance, heat intolerance, polydipsia, polyphagia and polyuria.   Genitourinary:  Negative for difficulty urinating, dysuria, flank pain, frequency, hematuria and urgency.   Musculoskeletal:  Negative for arthralgias, back pain, gait problem, joint swelling, myalgias, neck pain and neck stiffness.   Skin:  Negative for color change, pallor, rash and wound.   Allergic/Immunologic: Negative for environmental allergies, food allergies and immunocompromised state.   Neurological:  Negative for dizziness, tremors, seizures, syncope, speech difficulty, weakness, light-headedness, numbness and headaches.   Hematological:  Negative for adenopathy. Does not bruise/bleed easily.   Psychiatric/Behavioral:  Negative for agitation, behavioral problems, confusion, decreased concentration, dysphoric mood, hallucinations, sleep disturbance and suicidal ideas. The patient is not nervous/anxious.          PHYSICAL EXAM:    Vitals:    08/10/23 0933   BP: 124/76   Pulse: 60   Temp: 97.4 °F (36.3 °C)     Weight: 75.5 kg (166 lb 6.5 oz)   Height: 5' 7.72" (172 cm)   Body mass index is 25.51 kg/m².  Vitals:    08/10/23 0933   BP: 124/76   Pulse: 60   Temp: 97.4 °F (36.3 °C)   TempSrc: Temporal   Weight: 75.5 kg (166 lb 6.5 oz)   Height: 5' 7.72" (1.72 m)   PainSc:   7   PainLoc: Knee          Physical Exam  Vitals and nursing note reviewed.   Constitutional:       General: He is not in acute distress.     Appearance: He is not ill-appearing, toxic-appearing or diaphoretic.   HENT:      Head: Normocephalic and atraumatic.      Right Ear: Tympanic membrane, ear canal and external ear normal. There is no impacted cerumen.      Left Ear: " Tympanic membrane, ear canal and external ear normal. There is no impacted cerumen.      Nose: Nose normal. No congestion or rhinorrhea.      Mouth/Throat:      Mouth: Mucous membranes are moist.      Pharynx: Oropharynx is clear. No oropharyngeal exudate or posterior oropharyngeal erythema.   Eyes:      General: No scleral icterus.        Right eye: No discharge.         Left eye: No discharge.      Extraocular Movements: Extraocular movements intact.      Conjunctiva/sclera: Conjunctivae normal.      Pupils: Pupils are equal, round, and reactive to light.   Cardiovascular:      Rate and Rhythm: Normal rate and regular rhythm.      Pulses: Normal pulses.      Heart sounds: Normal heart sounds. No murmur heard.     No friction rub. No gallop.   Pulmonary:      Effort: Pulmonary effort is normal. No respiratory distress.      Breath sounds: Normal breath sounds. No stridor. No wheezing, rhonchi or rales.   Chest:      Chest wall: No tenderness.   Abdominal:      General: Bowel sounds are normal. There is no distension.      Palpations: Abdomen is soft. There is no mass.      Tenderness: There is no abdominal tenderness. There is no guarding or rebound.      Hernia: No hernia is present.   Musculoskeletal:         General: No swelling, tenderness, deformity or signs of injury. Normal range of motion.      Cervical back: Normal range of motion and neck supple. No rigidity.      Right lower leg: No edema.      Left lower leg: No edema.   Lymphadenopathy:      Cervical: No cervical adenopathy.   Skin:     General: Skin is warm and dry.      Capillary Refill: Capillary refill takes less than 2 seconds.      Coloration: Skin is not jaundiced or pale.      Findings: No bruising, erythema, lesion or rash.   Neurological:      General: No focal deficit present.      Mental Status: He is alert and oriented to person, place, and time.      Cranial Nerves: No cranial nerve deficit.      Sensory: No sensory deficit.      Motor:  "No weakness.      Coordination: Coordination normal.      Deep Tendon Reflexes: Reflexes normal.   Psychiatric:         Mood and Affect: Mood normal.         Behavior: Behavior normal.         Thought Content: Thought content normal.         Judgment: Judgment normal.            Labs:  Discussed with patient.    Lab Results   Component Value Date    GLU 98 08/08/2023     08/08/2023    K 4.9 08/08/2023     08/08/2023    CO2 26 08/08/2023    BUN 18 08/08/2023    CREATININE 0.96 08/08/2023    CALCIUM 9.7 08/08/2023    ALBUMIN 5.1 08/08/2023    BILITOT 0.4 08/08/2023    AST 20 08/08/2023    ALT 22 08/08/2023     Lab Results   Component Value Date    WBC 6.7 08/08/2023    RBC 5.60 08/08/2023    HGB 15.1 08/08/2023    HCT 47.1 08/08/2023    MCV 84 08/08/2023    RDW 14.1 08/08/2023     08/08/2023      Lab Results   Component Value Date    CHOL 190 08/08/2023    TRIG 132 08/08/2023    HDL 46 08/08/2023    LDLCALC 120 (H) 08/08/2023     Lab Results   Component Value Date    TSH 1.240 08/08/2023     No results found for: "HGBA1C", "ESTIMATEDAVG"   No components found for: "MICROALBUMIN/CREATININE"    ASSESSMENT & PLAN:    1. Pure hypercholesterolemia    2. BMI 25.0-25.9,adult    3. Acute pain of left knee/ sprain       Patient doing well in general, his LDL is mildly Elise medication is not considered at this time, healthy diet and exercise to be continued and will see the patient in about 6 months with labs at that time.      No orders of the defined types were placed in this encounter.     Follow up in about 6 months (around 2/10/2024), or if symptoms worsen or fail to improve. or sooner as needed.    Patient was counseled and questions and concerns were addressed.    Please note:  Parts of this report were done using a dictation software, voice to text, and sometimes the text contains some uncorrected words or sentences that are missed during revision.    "